# Patient Record
Sex: FEMALE | Race: WHITE | NOT HISPANIC OR LATINO | Employment: OTHER | URBAN - METROPOLITAN AREA
[De-identification: names, ages, dates, MRNs, and addresses within clinical notes are randomized per-mention and may not be internally consistent; named-entity substitution may affect disease eponyms.]

---

## 2017-01-04 ENCOUNTER — OFFICE VISIT (OUTPATIENT)
Dept: FAMILY MEDICINE CLINIC | Facility: CLINIC | Age: 57
End: 2017-01-04

## 2017-01-04 VITALS
BODY MASS INDEX: 35.7 KG/M2 | WEIGHT: 241 LBS | DIASTOLIC BLOOD PRESSURE: 80 MMHG | OXYGEN SATURATION: 99 % | RESPIRATION RATE: 16 BRPM | SYSTOLIC BLOOD PRESSURE: 140 MMHG | HEIGHT: 69 IN | HEART RATE: 80 BPM | TEMPERATURE: 98.9 F

## 2017-01-04 DIAGNOSIS — F33.1 MODERATE EPISODE OF RECURRENT MAJOR DEPRESSIVE DISORDER (HCC): Primary | ICD-10-CM

## 2017-01-04 DIAGNOSIS — J06.9 ACUTE URI: ICD-10-CM

## 2017-01-04 DIAGNOSIS — I10 ESSENTIAL HYPERTENSION, BENIGN: ICD-10-CM

## 2017-01-04 DIAGNOSIS — R41.3 MEMORY CHANGE: ICD-10-CM

## 2017-01-04 PROCEDURE — 99214 OFFICE O/P EST MOD 30 MIN: CPT | Performed by: FAMILY MEDICINE

## 2017-01-04 NOTE — MR AVS SNAPSHOT
Alice Akins   1/4/2017 10:45 AM   Office Visit    Provider:  Paul Lau MD   Department:  [facility] PRIMARY CARE   Dept Phone:  [phone]                Your Full Care Plan              Your Updated Medication List          This list is accurate as of: 1/4/17 11:26 AM.  Always use your most recent med list.                amLODIPine 5 MG tablet   Commonly known as:  NORVASC   Take 1 tablet by mouth Daily.       celecoxib 200 MG capsule   Commonly known as:  CeleBREX   Take 1 capsule by mouth daily. 1 po qd       cetirizine 10 MG tablet   Commonly known as:  zyrTEC       DULoxetine 60 MG capsule   Commonly known as:  CYMBALTA   Take 1 capsule by mouth daily. 1 po qd       fish oil 1000 MG capsule capsule       flunisolide 25 MCG/ACT (0.025%) solution nasal spray   Commonly known as:  NASALIDE   Inhale 2 sprays Every 12 (Twelve) Hours.       Ginkgo Biloba 40 MG tablet       GINSENG PO       levothyroxine 25 MCG tablet   Commonly known as:  SYNTHROID, LEVOTHROID   Take 1 tablet by mouth daily. 1 po qd       montelukast 10 MG tablet   Commonly known as:  SINGULAIR   Take 1 tablet by mouth every night. 1 po qd       pantoprazole 40 MG EC tablet   Commonly known as:  PROTONIX   Take 1 tablet by mouth daily. 1 po qd       * timolol 0.5 % ophthalmic solution   Commonly known as:  TIMOPTIC       * timolol 0.25 % ophthalmic gel-forming   Commonly known as:  TIMOPTIC-XR       valACYclovir 1000 MG tablet   Commonly known as:  VALTREX   Take 2 tabs po 12 hrs apart for 24 hrs       valsartan 320 MG tablet   Commonly known as:  DIOVAN   Take 1 tablet by mouth daily. 1 po qd       * Notice:  This list has 2 medication(s) that are the same as other medications prescribed for you. Read the directions carefully, and ask your doctor or other care provider to review them with you.            We Performed the Following     Ambulatory Referral to Psychiatry       You Were Diagnosed  "With        Codes Comments    Moderate episode of recurrent major depressive disorder    -  Primary ICD-10-CM: F33.1  ICD-9-CM: 296.32       Instructions    I have referred you to Elaina Angel for evaluation.Please follow up woth me in one month.     Patient Instructions History      Controlushart Signup     Our records indicate that you have an active [facility] [employer] account.    You can view your After Visit Summary by going to [employer] and logging in with your [employer] username and password.  If you don't have a [employer] username and password but a parent or guardian has access to your record, the parent or guardian should login with their own [employer] username and password and access your record to view the After Visit Summary.    If you have questions, you can email [email or web address] or call [phone] to talk to our [employer] staff.  Remember, [employer] is NOT to be used for urgent needs.  For medical emergencies, dial 911.               Other Info from Your Visit           Your Appointments     Feb 07, 2017  9:00 AM EST   Office Visit with Paul Lau MD   [facility] PRIMARY CARE (--)    [address] [facility] [address] [phone]           Arrive 15 minutes prior to appointment.            Feb 15, 2017 11:00 AM EST   Consult Sleep Clinic with  [facility] PROVIDER SCHEDULE   [facility] ([place])    [address] [facility] [address] [phone]              Allergies     Penicillins  Itching      Reason for Visit     Hypertension     Depression     Memory Loss           Vital Signs     Blood Pressure Pulse Temperature Respirations Height Weight    140/80 80 98.9 °F (37.2 °C) 16 68.5\" (174 cm) 241 lb (109 kg)    Oxygen Saturation Body Mass Index Smoking Status             99% 36.11 kg/m2 Former Smoker         Problems and Diagnoses Noted     Depression      "

## 2017-01-04 NOTE — PROGRESS NOTES
Subjective   Alice Akins is a 56 y.o. female.     History of Present Illness   Alice Adams is here for 3 issues  1)BP check .  She is taking her meds as Rx'd.  Denies headache, dizziness or vision changes.  2)Depression.  Pt is taking Cymbalta but feels it is not working as well for her.  She states she is isolating herself, eating more sleeping too much.  She states she just doesn't want to do anything.  Alice Adams states this began before the election but the election seemed to make it worse.  3)Memory Loss.  Alice Adams is interested in having some testing.  She states she is very forgetful and her wife is beginning to notice it.  4. She has had a few days of headache, congestion and ear fullness but is feeling better today.    The following portions of the patient's history were reviewed and updated as appropriate: allergies, current medications, past medical history, past social history and problem list.    Review of Systems   Constitutional: Positive for fatigue and fever.   HENT: Positive for sinus pressure.    Respiratory: Positive for cough.    Neurological: Positive for headaches.   All other systems reviewed and are negative.      Objective   Physical Exam   Constitutional: She is oriented to person, place, and time. She appears well-developed and well-nourished. No distress.   HENT:   Head: Normocephalic and atraumatic.   Nose: Nose normal.   Mouth/Throat: Oropharynx is clear and moist. No oropharyngeal exudate.   Eyes: Conjunctivae and EOM are normal. Pupils are equal, round, and reactive to light.   Neck: Normal range of motion.   Cardiovascular: Normal rate and regular rhythm.    Pulmonary/Chest: Effort normal and breath sounds normal. No stridor. No respiratory distress. She has no wheezes.   Lymphadenopathy:     She has no cervical adenopathy.   Neurological: She is alert and oriented to person, place, and time.   Skin: Skin is warm and dry. Rash noted.   Psychiatric: She has a normal mood and affect.  Her behavior is normal. Judgment and thought content normal.   Nursing note and vitals reviewed.      Assessment/Plan   Alice was seen today for hypertension, depression and memory loss.    Diagnoses and all orders for this visit:    Moderate episode of recurrent major depressive disorder  Memory change  -     Ambulatory Referral to Psychiatry  She is on Cymbalta and has struggled with depression most of her life. She denies suicidal thoughts or plans but feels she just has no desire to get off the couch and do anything. I am concerned about evaluation memory changes at this time because this could all be due to depressive syndrome. I have referred her to psych for help with this and she knows she can call me anytime.    Essential hypertension, benign  She is taking meds as prescribed and is stable.    Acute URI  I have advised OTC meds and she will call me is her symptoms get worse.

## 2017-02-07 ENCOUNTER — OFFICE VISIT (OUTPATIENT)
Dept: FAMILY MEDICINE CLINIC | Facility: CLINIC | Age: 57
End: 2017-02-07

## 2017-02-07 VITALS
HEART RATE: 68 BPM | RESPIRATION RATE: 16 BRPM | WEIGHT: 239 LBS | SYSTOLIC BLOOD PRESSURE: 130 MMHG | BODY MASS INDEX: 35.4 KG/M2 | HEIGHT: 69 IN | DIASTOLIC BLOOD PRESSURE: 74 MMHG | OXYGEN SATURATION: 99 %

## 2017-02-07 DIAGNOSIS — E03.9 ACQUIRED HYPOTHYROIDISM: ICD-10-CM

## 2017-02-07 DIAGNOSIS — E78.2 MIXED HYPERLIPIDEMIA: Primary | ICD-10-CM

## 2017-02-07 DIAGNOSIS — I10 ESSENTIAL HYPERTENSION, BENIGN: ICD-10-CM

## 2017-02-07 DIAGNOSIS — R53.81 MALAISE AND FATIGUE: ICD-10-CM

## 2017-02-07 DIAGNOSIS — F32.89 OTHER DEPRESSION: ICD-10-CM

## 2017-02-07 DIAGNOSIS — R53.83 MALAISE AND FATIGUE: ICD-10-CM

## 2017-02-07 PROCEDURE — 99213 OFFICE O/P EST LOW 20 MIN: CPT | Performed by: FAMILY MEDICINE

## 2017-02-07 RX ORDER — VILAZODONE HYDROCHLORIDE 10 MG/1
10 TABLET ORAL DAILY
COMMUNITY
End: 2017-05-03

## 2017-02-07 RX ORDER — BUPROPION HYDROCHLORIDE 150 MG/1
150 TABLET ORAL DAILY
COMMUNITY
End: 2017-05-03

## 2017-02-07 NOTE — PROGRESS NOTES
Subjective   Alice Akins is a 56 y.o. female.     History of Present Illness   Alice Adams is here for depression and bp check.  She has seen Elaina Angel twice.  She is trying Wellbutrin and starting a new medication tomorrow, Vibryd.She is feeling a little better because of an upcoming move and [place] out [place].     She has not been checking her bp at home.  Denies dizziness, blurred vision and vision changes.She is tolerating new medication well.  She and her partner are planning to move to [place] to live part time.She will be moving in May.    The following portions of the patient's history were reviewed and updated as appropriate: allergies, current medications, past medical history, past social history and problem list.    Review of Systems   All other systems reviewed and are negative.      Objective   Physical Exam   Constitutional: She is oriented to person, place, and time. She appears well-developed. No distress.   Cardiovascular: Normal rate and regular rhythm.    No murmur heard.  Pulmonary/Chest: Effort normal and breath sounds normal.   Neurological: She is alert and oriented to person, place, and time.   Psychiatric: She has a normal mood and affect. Her behavior is normal. Judgment and thought content normal.   Nursing note and vitals reviewed.      Assessment/Plan   Alice was seen today for depression and hypertension.    Diagnoses and all orders for this visit:    Other depression  She is doing better and changing medications to improve mood. Followed by psychiatry.    Essential hypertension, benign  Tolerating meds well and B/P is well controlled.    She will RTO for fasting labs in 3 months:   Mixed hyperlipidemia  -     Comprehensive metabolic panel; Future  -     Lipid Panel With LDL/HDL Ratio; Future    Malaise and fatigue  -     CBC and Differential; Future    Acquired hypothyroidism  -     TSH; Future

## 2017-05-01 ENCOUNTER — LAB (OUTPATIENT)
Dept: FAMILY MEDICINE CLINIC | Facility: CLINIC | Age: 57
End: 2017-05-01

## 2017-05-01 ENCOUNTER — RESULTS ENCOUNTER (OUTPATIENT)
Dept: FAMILY MEDICINE CLINIC | Facility: CLINIC | Age: 57
End: 2017-05-01

## 2017-05-01 DIAGNOSIS — R53.83 MALAISE AND FATIGUE: ICD-10-CM

## 2017-05-01 DIAGNOSIS — E03.9 ACQUIRED HYPOTHYROIDISM: ICD-10-CM

## 2017-05-01 DIAGNOSIS — R53.81 MALAISE AND FATIGUE: ICD-10-CM

## 2017-05-01 DIAGNOSIS — E78.2 MIXED HYPERLIPIDEMIA: ICD-10-CM

## 2017-05-01 LAB
ALBUMIN SERPL-MCNC: 4.3 G/DL (ref 3.5–5.2)
ALBUMIN/GLOB SERPL: 2.2 G/DL
ALP SERPL-CCNC: 73 U/L (ref 39–117)
ALT SERPL-CCNC: 15 U/L (ref 1–33)
AST SERPL-CCNC: 18 U/L (ref 1–32)
BASOPHILS # BLD AUTO: 0.05 10*3/MM3 (ref 0–0.2)
BASOPHILS NFR BLD AUTO: 0.6 % (ref 0–1.5)
BILIRUB SERPL-MCNC: 0.3 MG/DL (ref 0.1–1.2)
BUN SERPL-MCNC: 12 MG/DL (ref 6–20)
BUN/CREAT SERPL: 18.8 (ref 7–25)
CALCIUM SERPL-MCNC: 9.3 MG/DL (ref 8.6–10.5)
CHLORIDE SERPL-SCNC: 102 MMOL/L (ref 98–107)
CHOLEST SERPL-MCNC: 187 MG/DL (ref 0–200)
CO2 SERPL-SCNC: 25.4 MMOL/L (ref 22–29)
CREAT SERPL-MCNC: 0.64 MG/DL (ref 0.57–1)
EOSINOPHIL # BLD AUTO: 0.08 10*3/MM3 (ref 0–0.7)
EOSINOPHIL NFR BLD AUTO: 1 % (ref 0.3–6.2)
ERYTHROCYTE [DISTWIDTH] IN BLOOD BY AUTOMATED COUNT: 13.9 % (ref 11.7–13)
GLOBULIN SER CALC-MCNC: 2 GM/DL
GLUCOSE SERPL-MCNC: 110 MG/DL (ref 65–99)
HCT VFR BLD AUTO: 39.9 % (ref 35.6–45.5)
HDLC SERPL-MCNC: 53 MG/DL (ref 40–60)
HGB BLD-MCNC: 12.7 G/DL (ref 11.9–15.5)
IMM GRANULOCYTES # BLD: 0 10*3/MM3 (ref 0–0.03)
IMM GRANULOCYTES NFR BLD: 0 % (ref 0–0.5)
LDLC SERPL CALC-MCNC: 117 MG/DL (ref 0–100)
LDLC/HDLC SERPL: 2.2 {RATIO}
LYMPHOCYTES # BLD AUTO: 1.37 10*3/MM3 (ref 0.9–4.8)
LYMPHOCYTES NFR BLD AUTO: 16.4 % (ref 19.6–45.3)
MCH RBC QN AUTO: 28.3 PG (ref 26.9–32)
MCHC RBC AUTO-ENTMCNC: 31.8 G/DL (ref 32.4–36.3)
MCV RBC AUTO: 88.9 FL (ref 80.5–98.2)
MONOCYTES # BLD AUTO: 0.47 10*3/MM3 (ref 0.2–1.2)
MONOCYTES NFR BLD AUTO: 5.6 % (ref 5–12)
NEUTROPHILS # BLD AUTO: 6.37 10*3/MM3 (ref 1.9–8.1)
NEUTROPHILS NFR BLD AUTO: 76.4 % (ref 42.7–76)
PLATELET # BLD AUTO: 338 10*3/MM3 (ref 140–500)
POTASSIUM SERPL-SCNC: 4.1 MMOL/L (ref 3.5–5.2)
PROT SERPL-MCNC: 6.3 G/DL (ref 6–8.5)
RBC # BLD AUTO: 4.49 10*6/MM3 (ref 3.9–5.2)
SODIUM SERPL-SCNC: 140 MMOL/L (ref 136–145)
TRIGL SERPL-MCNC: 86 MG/DL (ref 0–150)
TSH SERPL DL<=0.005 MIU/L-ACNC: 0.6 MIU/ML (ref 0.27–4.2)
VLDLC SERPL CALC-MCNC: 17.2 MG/DL (ref 5–40)
WBC # BLD AUTO: 8.34 10*3/MM3 (ref 4.5–10.7)

## 2017-05-03 ENCOUNTER — OFFICE VISIT (OUTPATIENT)
Dept: FAMILY MEDICINE CLINIC | Facility: CLINIC | Age: 57
End: 2017-05-03

## 2017-05-03 VITALS
DIASTOLIC BLOOD PRESSURE: 70 MMHG | BODY MASS INDEX: 33.18 KG/M2 | OXYGEN SATURATION: 100 % | HEIGHT: 69 IN | HEART RATE: 82 BPM | SYSTOLIC BLOOD PRESSURE: 120 MMHG | WEIGHT: 224 LBS

## 2017-05-03 DIAGNOSIS — E78.2 MIXED HYPERLIPIDEMIA: ICD-10-CM

## 2017-05-03 DIAGNOSIS — B00.2 ORAL HERPES SIMPLEX INFECTION: ICD-10-CM

## 2017-05-03 DIAGNOSIS — R73.01 IMPAIRED FASTING GLUCOSE: ICD-10-CM

## 2017-05-03 DIAGNOSIS — I10 ESSENTIAL HYPERTENSION, BENIGN: ICD-10-CM

## 2017-05-03 DIAGNOSIS — J30.1 ALLERGIC RHINITIS DUE TO POLLEN, UNSPECIFIED RHINITIS SEASONALITY: Primary | ICD-10-CM

## 2017-05-03 DIAGNOSIS — E03.9 ACQUIRED HYPOTHYROIDISM: ICD-10-CM

## 2017-05-03 PROCEDURE — 99214 OFFICE O/P EST MOD 30 MIN: CPT | Performed by: FAMILY MEDICINE

## 2017-05-03 RX ORDER — VALACYCLOVIR HYDROCHLORIDE 1 G/1
TABLET, FILM COATED ORAL
Qty: 30 TABLET | Refills: 6 | Status: SHIPPED | OUTPATIENT
Start: 2017-05-03 | End: 2019-12-01 | Stop reason: SDUPTHER

## 2017-05-03 RX ORDER — LORAZEPAM 0.5 MG/1
1 TABLET ORAL DAILY PRN
COMMUNITY
Start: 2017-05-02 | End: 2019-04-04

## 2017-05-03 RX ORDER — FLUOXETINE HYDROCHLORIDE 40 MG/1
1 CAPSULE ORAL DAILY
COMMUNITY
Start: 2017-03-15 | End: 2017-12-07 | Stop reason: DRUGHIGH

## 2017-05-03 RX ORDER — AZELASTINE 1 MG/ML
2 SPRAY, METERED NASAL 2 TIMES DAILY
Qty: 30 ML | Refills: 6 | Status: SHIPPED | OUTPATIENT
Start: 2017-05-03 | End: 2017-06-05 | Stop reason: SDUPTHER

## 2017-05-03 RX ORDER — AZELASTINE 1 MG/ML
2 SPRAY, METERED NASAL 2 TIMES DAILY
COMMUNITY
End: 2017-05-03 | Stop reason: SDUPTHER

## 2017-05-03 RX ORDER — FLUOXETINE HYDROCHLORIDE 20 MG/1
60 CAPSULE ORAL DAILY
COMMUNITY
Start: 2017-04-28 | End: 2022-01-20 | Stop reason: ALTCHOICE

## 2017-06-05 DIAGNOSIS — J30.1 ALLERGIC RHINITIS DUE TO POLLEN, UNSPECIFIED RHINITIS SEASONALITY: ICD-10-CM

## 2017-06-05 RX ORDER — CELECOXIB 200 MG/1
200 CAPSULE ORAL DAILY
Qty: 90 CAPSULE | Refills: 0 | Status: SHIPPED | OUTPATIENT
Start: 2017-06-05 | End: 2017-08-10 | Stop reason: SDUPTHER

## 2017-06-05 RX ORDER — AMLODIPINE BESYLATE 5 MG/1
5 TABLET ORAL DAILY
Qty: 90 TABLET | Refills: 0 | Status: SHIPPED | OUTPATIENT
Start: 2017-06-05 | End: 2017-08-10 | Stop reason: SDUPTHER

## 2017-06-05 RX ORDER — AZELASTINE 1 MG/ML
2 SPRAY, METERED NASAL 2 TIMES DAILY
Qty: 30 ML | Refills: 6 | Status: SHIPPED | OUTPATIENT
Start: 2017-06-05 | End: 2017-12-07 | Stop reason: SDUPTHER

## 2017-06-05 RX ORDER — LEVOTHYROXINE SODIUM 0.03 MG/1
25 TABLET ORAL DAILY
Qty: 90 TABLET | Refills: 0 | Status: SHIPPED | OUTPATIENT
Start: 2017-06-05 | End: 2017-08-10 | Stop reason: SDUPTHER

## 2017-06-05 RX ORDER — MONTELUKAST SODIUM 10 MG/1
10 TABLET ORAL NIGHTLY
Qty: 90 TABLET | Refills: 0 | Status: SHIPPED | OUTPATIENT
Start: 2017-06-05 | End: 2017-08-10 | Stop reason: SDUPTHER

## 2017-06-05 RX ORDER — PANTOPRAZOLE SODIUM 40 MG/1
40 TABLET, DELAYED RELEASE ORAL DAILY
Qty: 90 TABLET | Refills: 0 | Status: SHIPPED | OUTPATIENT
Start: 2017-06-05 | End: 2017-08-10 | Stop reason: SDUPTHER

## 2017-06-05 RX ORDER — VALSARTAN 320 MG/1
320 TABLET ORAL DAILY
Qty: 90 TABLET | Refills: 0 | Status: SHIPPED | OUTPATIENT
Start: 2017-06-05 | End: 2017-08-10 | Stop reason: SDUPTHER

## 2017-06-05 RX ORDER — FLUNISOLIDE 0.25 MG/ML
2 SOLUTION NASAL EVERY 12 HOURS
Qty: 1 BOTTLE | Refills: 6 | Status: SHIPPED | OUTPATIENT
Start: 2017-06-05 | End: 2017-08-10 | Stop reason: SDUPTHER

## 2017-08-10 RX ORDER — CELECOXIB 200 MG/1
200 CAPSULE ORAL DAILY
Qty: 90 CAPSULE | Refills: 0 | Status: SHIPPED | OUTPATIENT
Start: 2017-08-10 | End: 2017-12-07 | Stop reason: SDUPTHER

## 2017-08-10 RX ORDER — PANTOPRAZOLE SODIUM 40 MG/1
40 TABLET, DELAYED RELEASE ORAL DAILY
Qty: 90 TABLET | Refills: 0 | Status: SHIPPED | OUTPATIENT
Start: 2017-08-10 | End: 2017-12-07 | Stop reason: SDUPTHER

## 2017-08-10 RX ORDER — AMLODIPINE BESYLATE 5 MG/1
5 TABLET ORAL DAILY
Qty: 90 TABLET | Refills: 0 | Status: SHIPPED | OUTPATIENT
Start: 2017-08-10 | End: 2017-12-07 | Stop reason: SDUPTHER

## 2017-08-10 RX ORDER — MONTELUKAST SODIUM 10 MG/1
10 TABLET ORAL NIGHTLY
Qty: 90 TABLET | Refills: 0 | Status: SHIPPED | OUTPATIENT
Start: 2017-08-10 | End: 2017-12-07 | Stop reason: SDUPTHER

## 2017-08-10 RX ORDER — LEVOTHYROXINE SODIUM 0.03 MG/1
25 TABLET ORAL DAILY
Qty: 90 TABLET | Refills: 0 | Status: SHIPPED | OUTPATIENT
Start: 2017-08-10 | End: 2017-12-07 | Stop reason: SDUPTHER

## 2017-08-10 RX ORDER — VALSARTAN 320 MG/1
320 TABLET ORAL DAILY
Qty: 90 TABLET | Refills: 0 | Status: SHIPPED | OUTPATIENT
Start: 2017-08-10 | End: 2017-12-07 | Stop reason: SDUPTHER

## 2017-08-10 RX ORDER — FLUNISOLIDE 0.25 MG/ML
2 SOLUTION NASAL EVERY 12 HOURS
Qty: 1 BOTTLE | Refills: 2 | Status: SHIPPED | OUTPATIENT
Start: 2017-08-10 | End: 2017-12-07

## 2017-12-05 DIAGNOSIS — I10 ESSENTIAL HYPERTENSION, MALIGNANT: Primary | ICD-10-CM

## 2017-12-05 DIAGNOSIS — Z13.220 SCREENING FOR LIPOID DISORDERS: ICD-10-CM

## 2017-12-05 DIAGNOSIS — E03.8 TSH DEFICIENCY: ICD-10-CM

## 2017-12-05 DIAGNOSIS — Z00.00 ROUTINE GENERAL MEDICAL EXAMINATION AT A HEALTH CARE FACILITY: ICD-10-CM

## 2017-12-05 DIAGNOSIS — Z11.59 NEED FOR HEPATITIS C SCREENING TEST: ICD-10-CM

## 2017-12-06 LAB
ALBUMIN SERPL-MCNC: 4.6 G/DL (ref 3.5–5.2)
ALBUMIN/GLOB SERPL: 2 G/DL
ALP SERPL-CCNC: 73 U/L (ref 39–117)
ALT SERPL-CCNC: 17 U/L (ref 1–33)
AST SERPL-CCNC: 17 U/L (ref 1–32)
BILIRUB SERPL-MCNC: 0.4 MG/DL (ref 0.1–1.2)
BUN SERPL-MCNC: 13 MG/DL (ref 6–20)
BUN/CREAT SERPL: 20.3 (ref 7–25)
CALCIUM SERPL-MCNC: 9.6 MG/DL (ref 8.6–10.5)
CHLORIDE SERPL-SCNC: 102 MMOL/L (ref 98–107)
CHOLEST SERPL-MCNC: 230 MG/DL (ref 0–200)
CO2 SERPL-SCNC: 28.5 MMOL/L (ref 22–29)
CREAT SERPL-MCNC: 0.64 MG/DL (ref 0.57–1)
ERYTHROCYTE [DISTWIDTH] IN BLOOD BY AUTOMATED COUNT: 13.8 % (ref 11.7–13)
GFR SERPLBLD CREATININE-BSD FMLA CKD-EPI: 116 ML/MIN/1.73
GFR SERPLBLD CREATININE-BSD FMLA CKD-EPI: 96 ML/MIN/1.73
GLOBULIN SER CALC-MCNC: 2.3 GM/DL
GLUCOSE SERPL-MCNC: 88 MG/DL (ref 65–99)
HCT VFR BLD AUTO: 41.7 % (ref 35.6–45.5)
HCV AB S/CO SERPL IA: <0.1 S/CO RATIO (ref 0–0.9)
HCV AB SERPL QL IA: NORMAL
HDLC SERPL-MCNC: 65 MG/DL (ref 40–60)
HGB BLD-MCNC: 13.2 G/DL (ref 11.9–15.5)
LDLC SERPL CALC-MCNC: 143 MG/DL (ref 0–100)
LDLC/HDLC SERPL: 2.19 {RATIO}
MCH RBC QN AUTO: 28.5 PG (ref 26.9–32)
MCHC RBC AUTO-ENTMCNC: 31.7 G/DL (ref 32.4–36.3)
MCV RBC AUTO: 90.1 FL (ref 80.5–98.2)
PLATELET # BLD AUTO: 332 10*3/MM3 (ref 140–500)
POTASSIUM SERPL-SCNC: 4.3 MMOL/L (ref 3.5–5.2)
PROT SERPL-MCNC: 6.9 G/DL (ref 6–8.5)
RBC # BLD AUTO: 4.63 10*6/MM3 (ref 3.9–5.2)
SODIUM SERPL-SCNC: 142 MMOL/L (ref 136–145)
TRIGL SERPL-MCNC: 112 MG/DL (ref 0–150)
TSH SERPL DL<=0.005 MIU/L-ACNC: 1.35 MIU/ML (ref 0.27–4.2)
VLDLC SERPL CALC-MCNC: 22.4 MG/DL (ref 5–40)
WBC # BLD AUTO: 6.41 10*3/MM3 (ref 4.5–10.7)

## 2017-12-07 ENCOUNTER — OFFICE VISIT (OUTPATIENT)
Dept: FAMILY MEDICINE CLINIC | Facility: CLINIC | Age: 57
End: 2017-12-07

## 2017-12-07 VITALS
WEIGHT: 219 LBS | BODY MASS INDEX: 32.44 KG/M2 | HEIGHT: 69 IN | RESPIRATION RATE: 16 BRPM | OXYGEN SATURATION: 99 % | SYSTOLIC BLOOD PRESSURE: 144 MMHG | DIASTOLIC BLOOD PRESSURE: 64 MMHG | HEART RATE: 68 BPM

## 2017-12-07 DIAGNOSIS — Z23 NEED FOR IMMUNIZATION AGAINST INFLUENZA: ICD-10-CM

## 2017-12-07 DIAGNOSIS — I10 ESSENTIAL HYPERTENSION: ICD-10-CM

## 2017-12-07 DIAGNOSIS — E78.49 OTHER HYPERLIPIDEMIA: ICD-10-CM

## 2017-12-07 DIAGNOSIS — F32.89 OTHER DEPRESSION: ICD-10-CM

## 2017-12-07 DIAGNOSIS — Z00.00 ROUTINE GENERAL MEDICAL EXAMINATION AT A HEALTH CARE FACILITY: Primary | ICD-10-CM

## 2017-12-07 DIAGNOSIS — E03.9 ACQUIRED HYPOTHYROIDISM: ICD-10-CM

## 2017-12-07 DIAGNOSIS — Z12.39 SCREENING FOR MALIGNANT NEOPLASM OF BREAST: ICD-10-CM

## 2017-12-07 PROCEDURE — 99396 PREV VISIT EST AGE 40-64: CPT | Performed by: FAMILY MEDICINE

## 2017-12-07 PROCEDURE — 90686 IIV4 VACC NO PRSV 0.5 ML IM: CPT | Performed by: FAMILY MEDICINE

## 2017-12-07 PROCEDURE — 90471 IMMUNIZATION ADMIN: CPT | Performed by: FAMILY MEDICINE

## 2017-12-07 RX ORDER — FLUNISOLIDE 0.25 MG/ML
2 SOLUTION NASAL EVERY 12 HOURS
Qty: 1 BOTTLE | Refills: 11 | Status: SHIPPED | OUTPATIENT
Start: 2017-12-07

## 2017-12-07 RX ORDER — AZELASTINE 1 MG/ML
2 SPRAY, METERED NASAL 2 TIMES DAILY
Qty: 30 ML | Refills: 11 | Status: SHIPPED | OUTPATIENT
Start: 2017-12-07 | End: 2019-12-01 | Stop reason: SDUPTHER

## 2017-12-07 RX ORDER — AMLODIPINE BESYLATE 5 MG/1
5 TABLET ORAL DAILY
Qty: 90 TABLET | Refills: 3 | Status: SHIPPED | OUTPATIENT
Start: 2017-12-07 | End: 2020-01-20 | Stop reason: SDUPTHER

## 2017-12-07 RX ORDER — VALSARTAN 320 MG/1
320 TABLET ORAL DAILY
Qty: 90 TABLET | Refills: 3 | Status: SHIPPED | OUTPATIENT
Start: 2017-12-07 | End: 2020-01-20 | Stop reason: SDUPTHER

## 2017-12-07 RX ORDER — LEVOTHYROXINE SODIUM 0.03 MG/1
25 TABLET ORAL DAILY
Qty: 90 TABLET | Refills: 3 | Status: SHIPPED | OUTPATIENT
Start: 2017-12-07 | End: 2020-01-20 | Stop reason: SDUPTHER

## 2017-12-07 RX ORDER — CELECOXIB 200 MG/1
200 CAPSULE ORAL DAILY
Qty: 90 CAPSULE | Refills: 3 | Status: SHIPPED | OUTPATIENT
Start: 2017-12-07 | End: 2019-03-15 | Stop reason: SDUPTHER

## 2017-12-07 RX ORDER — MONTELUKAST SODIUM 10 MG/1
10 TABLET ORAL NIGHTLY
Qty: 90 TABLET | Refills: 3 | Status: SHIPPED | OUTPATIENT
Start: 2017-12-07 | End: 2019-04-04 | Stop reason: SDUPTHER

## 2017-12-07 RX ORDER — PANTOPRAZOLE SODIUM 40 MG/1
40 TABLET, DELAYED RELEASE ORAL DAILY
Qty: 90 TABLET | Refills: 3 | Status: SHIPPED | OUTPATIENT
Start: 2017-12-07 | End: 2019-04-04 | Stop reason: SDUPTHER

## 2017-12-07 NOTE — PROGRESS NOTES
"Preventive Exam    History of Present Illness: Alice Adams is here for check up and review of routine health maintenance. She states she is doing well and has no concerns  Alice Adams has a history of chronic hypertension and has been well controlled on current medications.She is tolerating medications without side effect. She reports no vision changes, headaches or lightheadedness. She is requesting refills of medications.  She has a history of chronic hyperlipidemia and needs lab work today to evaluate. She is not on medications.  Alice Adams has a history of chronic hypothyroidism and has been well controlled on current dose of replacement.She report no concerning symptoms: no cold intolerance, fatigue or hair loss.   Alice Adams states that she is getting good relief from symptoms on current medication. This is a chronic problem that is responding well to treatment. She has no intolerable side effects to medication and reports that his helps lift mood and makes daily life, relationships better. No thoughts of self harm expressed.  She is also can struggling with back pain. She is on Celebrex and sees a chiropractor and she is still struggling. She would like to see PT for this when she gets back to [place] and needs a written referral.    REVIEW OF SYSTEMS  Constitutional: Negative.    HENT: Negative.    Eyes: Negative.    Respiratory: Negative.    Cardiovascular: Negative.    Gastrointestinal: Negative.    Endocrine: Negative.    Genitourinary: Negative.    Musculoskeletal: Negative.  Skin: Negative.    Allergic/Immunologic: Negative.    Neurological: Negative.    Hematological: Negative.    Psychiatric/Behavioral: Negative.    All other systems reviewed and are negative.          PHYSICAL EXAM    Vitals:    12/07/17 0859   BP: 144/64   Pulse: 68   Resp: 16   SpO2: 99%   Weight: 99.3 kg (219 lb)   Height: 174 cm (68.5\")     GENERAL: alert and oriented, afebrile and vital signs stable  HEENT: oral mucosa moist, PEERLA, EOM, " conjunctiva normal  No cervical adenopathy  LUNGS: clear to ascultation bilaterally, no rales, ronchi or wheezing  HEART: RRR S1 S2 without murmers, thrills, rubs or gallops  CHEST WALL: within normal limits, no tenderness  ABDOMEN: WNL. Normal BS.  EXTREMITIES: No clubbing, cyanosis or edema noted. Normal Pulses.  SKIN: warm, dry, no rashes noted  NEURO: CN II- XII grossly intact    ASSESSMENT AND PLAN  Problem List Items Addressed This Visit        Cardiovascular and Mediastinum    Hypertension  Well controlled on current medication, will refill medication today and as needed. She will RTO for repeat B/P check in 6 months.       Endocrine    Hypothyroidism  Well managed on current dose of thyroid replacement. TSH reviewed and on chart.  Well refill medications as needed.       Other    Depression  She is well managed on current meds and reports no signs or symptoms of self harm, suicidal ideation. This medication helps with daily life and relationships. Will refill as needed and advised 6 month follow up.      Other Visit Diagnoses     Routine general medical examination at a health care facility    -  Primary    Screening for malignant neoplasm of breast        Relevant Orders    Mammo Screening Bilateral With CAD    Need for immunization against influenza        Relevant Orders    Flu Vaccine Quad PF 3YR+ (FLUARIX/FLUZONE 8790-0811) (Completed)    Other hyperlipidemia  Will check lipid panel today and She was advised to continue a healthy low fat diet, include regular exercise/              Routine health maintenance reviewed and discussed with Alice.    .  Orders Placed This Encounter   Procedures   • Mammo Screening Bilateral With CAD     Standing Status:   Future     Standing Expiration Date:   12/8/2018     Order Specific Question:   Reason for Exam:     Answer:   screening mammogram   • Flu Vaccine Quad PF 3YR+ (FLUARIX/FLUZONE [id number])     Return in about 6 months (around 6/7/2018) for Recheck.

## 2017-12-07 NOTE — PATIENT INSTRUCTIONS
Annual Wellness  Personal Prevention Plan of Service     Date of Office Visit:  2017  Encounter Provider:  Paul Lau MD  Place of Service:  [facility] PRIMARY CARE  Patient Name: Alice Akins  :  1960    As part of the Annual Wellness portion of your visit today, we are providing you with this personalized preventive plan of services (PPPS). This plan is based upon recommendations of the [place] Preventive Services Task Force (USPSTF) and the Advisory Committee on Immunization Practices (ACIP).    This lists the preventive care services that should be considered, and provides dates of when you are due. Items listed as completed are up-to-date and do not require any further intervention.    Health Maintenance   Topic Date Due   • MAMMOGRAM  2018   • LIPID PANEL  2018   • TDAP/TD VACCINES (2 - Td) 2022   • COLONOSCOPY  2026   • HEPATITIS C SCREENING  Completed   • INFLUENZA VACCINE  Completed   • PAP SMEAR  Excluded       Orders Placed This Encounter   Procedures   • Mammo Screening Bilateral With CAD     Standing Status:   Future     Standing Expiration Date:   2018     Order Specific Question:   Reason for Exam:     Answer:   screening mammogram   • Flu Vaccine Quad PF 3YR+ (FLUARIX/FLUZONE [id number])       Return in about 6 months (around 2018) for Recheck.

## 2017-12-27 ENCOUNTER — TELEPHONE (OUTPATIENT)
Dept: FAMILY MEDICINE CLINIC | Facility: CLINIC | Age: 57
End: 2017-12-27

## 2017-12-27 RX ORDER — OSELTAMIVIR PHOSPHATE 75 MG/1
75 CAPSULE ORAL DAILY
Qty: 10 CAPSULE | Refills: 0 | Status: SHIPPED | OUTPATIENT
Start: 2017-12-27 | End: 2019-04-04

## 2017-12-27 NOTE — TELEPHONE ENCOUNTER
----- Message from Maria Alejandra Pineda sent at 12/27/2017 12:15 PM EST -----  Patient partner has been dx with the flu, and is requesting Tamiflu be called to [facility].  Call back number [phone]

## 2018-12-04 ENCOUNTER — TRANSCRIBE ORDERS (OUTPATIENT)
Dept: ADMINISTRATIVE | Facility: HOSPITAL | Age: 58
End: 2018-12-04

## 2018-12-04 ENCOUNTER — HOSPITAL ENCOUNTER (OUTPATIENT)
Dept: GENERAL RADIOLOGY | Facility: HOSPITAL | Age: 58
Discharge: HOME | End: 2018-12-04
Attending: ORTHOPAEDIC SURGERY

## 2018-12-04 ENCOUNTER — LAB (OUTPATIENT)
Dept: LAB | Facility: HOSPITAL | Age: 58
End: 2018-12-04
Attending: ORTHOPAEDIC SURGERY

## 2018-12-04 ENCOUNTER — HOSPITAL ENCOUNTER (OUTPATIENT)
Dept: CARDIOLOGY | Facility: HOSPITAL | Age: 58
Discharge: HOME | End: 2018-12-04
Attending: ORTHOPAEDIC SURGERY | Admitting: ORTHOPAEDIC SURGERY

## 2018-12-04 DIAGNOSIS — Z01.818 PRE-OP EXAM: ICD-10-CM

## 2018-12-04 DIAGNOSIS — M17.12 OSTEOARTHRITIS OF LEFT KNEE, UNSPECIFIED OSTEOARTHRITIS TYPE: ICD-10-CM

## 2018-12-04 DIAGNOSIS — M17.12 OSTEOARTHRITIS OF LEFT KNEE, UNSPECIFIED OSTEOARTHRITIS TYPE: Primary | ICD-10-CM

## 2018-12-04 LAB
ALBUMIN SERPL-MCNC: 4.2 G/DL (ref 3.5–5.2)
ALBUMIN/GLOB SERPL: 1.7 G/DL
ALP SERPL-CCNC: 80 U/L (ref 39–117)
ALT SERPL W P-5'-P-CCNC: 16 U/L (ref 1–33)
ANION GAP SERPL CALCULATED.3IONS-SCNC: 9.7 MMOL/L
APTT PPP: 27.7 SECONDS (ref 22.7–35.4)
AST SERPL-CCNC: 20 U/L (ref 1–32)
BASOPHILS # BLD AUTO: 0.04 10*3/MM3 (ref 0–0.2)
BASOPHILS NFR BLD AUTO: 0.7 % (ref 0–1.5)
BILIRUB SERPL-MCNC: 0.4 MG/DL (ref 0.1–1.2)
BILIRUB UR QL STRIP: NEGATIVE
BUN BLD-MCNC: 10 MG/DL (ref 6–20)
BUN/CREAT SERPL: 16.9 (ref 7–25)
CALCIUM SPEC-SCNC: 9.4 MG/DL (ref 8.6–10.5)
CHLORIDE SERPL-SCNC: 103 MMOL/L (ref 98–107)
CLARITY UR: CLEAR
CO2 SERPL-SCNC: 27.3 MMOL/L (ref 22–29)
COLOR UR: YELLOW
CREAT BLD-MCNC: 0.59 MG/DL (ref 0.57–1)
DEPRECATED RDW RBC AUTO: 43.9 FL (ref 37–54)
EOSINOPHIL # BLD AUTO: 0.07 10*3/MM3 (ref 0–0.7)
EOSINOPHIL NFR BLD AUTO: 1.2 % (ref 0.3–6.2)
ERYTHROCYTE [DISTWIDTH] IN BLOOD BY AUTOMATED COUNT: 14.1 % (ref 11.7–13)
ERYTHROCYTE [SEDIMENTATION RATE] IN BLOOD: 9 MM/HR (ref 0–30)
GFR SERPL CREATININE-BSD FRML MDRD: 105 ML/MIN/1.73
GLOBULIN UR ELPH-MCNC: 2.5 GM/DL
GLUCOSE BLD-MCNC: 100 MG/DL (ref 65–99)
GLUCOSE UR STRIP-MCNC: NEGATIVE MG/DL
HCT VFR BLD AUTO: 38.7 % (ref 35.6–45.5)
HGB BLD-MCNC: 12.5 G/DL (ref 11.9–15.5)
HGB UR QL STRIP.AUTO: NEGATIVE
IMM GRANULOCYTES # BLD: 0.01 10*3/MM3 (ref 0–0.03)
IMM GRANULOCYTES NFR BLD: 0.2 % (ref 0–0.5)
INR PPP: 0.96 (ref 0.9–1.1)
KETONES UR QL STRIP: NEGATIVE
LEUKOCYTE ESTERASE UR QL STRIP.AUTO: NEGATIVE
LYMPHOCYTES # BLD AUTO: 1.75 10*3/MM3 (ref 0.9–4.8)
LYMPHOCYTES NFR BLD AUTO: 30.7 % (ref 19.6–45.3)
MCH RBC QN AUTO: 27.5 PG (ref 26.9–32)
MCHC RBC AUTO-ENTMCNC: 32.3 G/DL (ref 32.4–36.3)
MCV RBC AUTO: 85.1 FL (ref 80.5–98.2)
MONOCYTES # BLD AUTO: 0.44 10*3/MM3 (ref 0.2–1.2)
MONOCYTES NFR BLD AUTO: 7.7 % (ref 5–12)
NEUTROPHILS # BLD AUTO: 3.4 10*3/MM3 (ref 1.9–8.1)
NEUTROPHILS NFR BLD AUTO: 59.7 % (ref 42.7–76)
NITRITE UR QL STRIP: NEGATIVE
PH UR STRIP.AUTO: >=9 [PH] (ref 5–8)
PLATELET # BLD AUTO: 342 10*3/MM3 (ref 140–500)
PMV BLD AUTO: 10.2 FL (ref 6–12)
POTASSIUM BLD-SCNC: 4.5 MMOL/L (ref 3.5–5.2)
PROT SERPL-MCNC: 6.7 G/DL (ref 6–8.5)
PROT UR QL STRIP: NEGATIVE
PROTHROMBIN TIME: 12.6 SECONDS (ref 11.7–14.2)
RBC # BLD AUTO: 4.55 10*6/MM3 (ref 3.9–5.2)
SODIUM BLD-SCNC: 140 MMOL/L (ref 136–145)
SP GR UR STRIP: 1.01 (ref 1–1.03)
UROBILINOGEN UR QL STRIP: ABNORMAL
WBC NRBC COR # BLD: 5.7 10*3/MM3 (ref 4.5–10.7)

## 2018-12-04 PROCEDURE — 93010 ELECTROCARDIOGRAM REPORT: CPT | Performed by: INTERNAL MEDICINE

## 2018-12-04 PROCEDURE — 85025 COMPLETE CBC W/AUTO DIFF WBC: CPT

## 2018-12-04 PROCEDURE — 80053 COMPREHEN METABOLIC PANEL: CPT

## 2018-12-04 PROCEDURE — 71046 X-RAY EXAM CHEST 2 VIEWS: CPT

## 2018-12-04 PROCEDURE — 81003 URINALYSIS AUTO W/O SCOPE: CPT

## 2018-12-04 PROCEDURE — 85730 THROMBOPLASTIN TIME PARTIAL: CPT

## 2018-12-04 PROCEDURE — 85652 RBC SED RATE AUTOMATED: CPT

## 2018-12-04 PROCEDURE — 93005 ELECTROCARDIOGRAM TRACING: CPT | Performed by: ORTHOPAEDIC SURGERY

## 2018-12-04 PROCEDURE — 85610 PROTHROMBIN TIME: CPT

## 2018-12-04 PROCEDURE — 36415 COLL VENOUS BLD VENIPUNCTURE: CPT

## 2019-03-07 RX ORDER — CELECOXIB 200 MG/1
CAPSULE ORAL
Qty: 90 CAPSULE | Refills: 2 | OUTPATIENT
Start: 2019-03-07

## 2019-03-15 RX ORDER — CELECOXIB 200 MG/1
200 CAPSULE ORAL DAILY
Qty: 90 CAPSULE | Refills: 0 | Status: SHIPPED | OUTPATIENT
Start: 2019-03-15 | End: 2019-04-04 | Stop reason: SDUPTHER

## 2019-03-19 RX ORDER — CELECOXIB 200 MG/1
CAPSULE ORAL
Qty: 90 CAPSULE | Refills: 2 | OUTPATIENT
Start: 2019-03-19

## 2019-04-04 ENCOUNTER — OFFICE VISIT (OUTPATIENT)
Dept: FAMILY MEDICINE CLINIC | Facility: CLINIC | Age: 59
End: 2019-04-04

## 2019-04-04 VITALS
HEIGHT: 68 IN | OXYGEN SATURATION: 99 % | BODY MASS INDEX: 34.25 KG/M2 | RESPIRATION RATE: 16 BRPM | WEIGHT: 226 LBS | HEART RATE: 69 BPM | DIASTOLIC BLOOD PRESSURE: 84 MMHG | SYSTOLIC BLOOD PRESSURE: 124 MMHG

## 2019-04-04 DIAGNOSIS — K21.9 GASTROESOPHAGEAL REFLUX DISEASE WITHOUT ESOPHAGITIS: ICD-10-CM

## 2019-04-04 DIAGNOSIS — E78.49 OTHER HYPERLIPIDEMIA: ICD-10-CM

## 2019-04-04 DIAGNOSIS — Z00.00 ROUTINE GENERAL MEDICAL EXAMINATION AT A HEALTH CARE FACILITY: Primary | ICD-10-CM

## 2019-04-04 DIAGNOSIS — E03.9 ACQUIRED HYPOTHYROIDISM: ICD-10-CM

## 2019-04-04 DIAGNOSIS — I10 ESSENTIAL HYPERTENSION: ICD-10-CM

## 2019-04-04 PROCEDURE — 99213 OFFICE O/P EST LOW 20 MIN: CPT | Performed by: FAMILY MEDICINE

## 2019-04-04 PROCEDURE — 99396 PREV VISIT EST AGE 40-64: CPT | Performed by: FAMILY MEDICINE

## 2019-04-04 RX ORDER — LATANOPROST 50 UG/ML
1 SOLUTION/ DROPS OPHTHALMIC NIGHTLY
COMMUNITY

## 2019-04-04 RX ORDER — PANTOPRAZOLE SODIUM 40 MG/1
40 TABLET, DELAYED RELEASE ORAL DAILY
Qty: 90 TABLET | Refills: 3 | Status: SHIPPED | OUTPATIENT
Start: 2019-04-04 | End: 2022-01-20 | Stop reason: ALTCHOICE

## 2019-04-04 RX ORDER — ROSUVASTATIN CALCIUM 10 MG/1
10 TABLET, COATED ORAL DAILY
COMMUNITY
Start: 2019-04-01 | End: 2020-01-20 | Stop reason: SDUPTHER

## 2019-04-04 RX ORDER — CELECOXIB 200 MG/1
200 CAPSULE ORAL DAILY
Qty: 90 CAPSULE | Refills: 3 | Status: SHIPPED | OUTPATIENT
Start: 2019-04-04 | End: 2020-01-20 | Stop reason: SDUPTHER

## 2019-04-04 RX ORDER — MONTELUKAST SODIUM 10 MG/1
10 TABLET ORAL NIGHTLY
Qty: 90 TABLET | Refills: 3 | Status: SHIPPED | OUTPATIENT
Start: 2019-04-04 | End: 2020-01-20 | Stop reason: SDUPTHER

## 2019-04-04 NOTE — PROGRESS NOTES
"Preventive Exam    History of Present Illness: Alice is here for check up and review of routine health maintenance. She states she is doing and we address the following health issues:  Alice has a history of chronic hypertension and has been well controlled on current medications.She is tolerating medications without side effect. She reports no vision changes, headaches or lightheadedness. She is requesting refills of medications.  Alice has a history of chronic hypothyroidism and has been well controlled on current dose of replacement.She report no concerning symptoms: no cold intolerance, fatigue or hair loss.   GERD - this is a chronic problem for her and she does well on Protonix.   Alice has a hx of depression and states that she is getting good relief from symptoms on current medication. This is a chronic problem that is responding well to treatment. She has no intolerable side effects to medication and reports that his helps lift mood and makes daily life, relationships better. No thoughts of self harm expressed.  She has a history of chronic hyperlipidemia and needs lab work today to evaluate response to therapy. She is tolerating medications well without side effects.    REVIEW OF SYSTEMS  Constitutional: Negative.    HENT: Negative.    Eyes: Negative.    Respiratory: Negative.    Cardiovascular: Negative.    Gastrointestinal: Negative.    Endocrine: Negative.    Genitourinary: Negative.    Musculoskeletal: Negative.  Skin: Negative.    Allergic/Immunologic: Negative.    Neurological: Negative.    Hematological: Negative.    Psychiatric/Behavioral: Negative.    All other systems reviewed and are negative.          PHYSICAL EXAM    Vitals:    04/04/19 0925   BP: 124/84   Pulse: 69   Resp: 16   SpO2: 99%   Weight: 103 kg (226 lb)   Height: 172.7 cm (68\")     GENERAL: alert and oriented, afebrile and vital signs stable  HEENT: oral mucosa moist, PEERLA, EOM, conjunctiva normal  No cervical adenopathy  LUNGS: clear " to ascultation bilaterally, no rales, ronchi or wheezing  HEART: RRR S1 S2 without murmers, thrills, rubs or gallops  CHEST WALL: within normal limits, no tenderness  ABDOMEN: WNL. Normal BS.  EXTREMITIES: No clubbing, cyanosis or edema noted. Normal Pulses.  SKIN: warm, dry, no rashes noted  NEURO: CN II- XII grossly intact    ASSESSMENT AND PLAN  Problem List Items Addressed This Visit        Cardiovascular and Mediastinum    Hypertension  Well controlled on current medication, will refill medication today and as needed. She will RTO for repeat B/P check in 6 months.    Relevant Orders    Comprehensive Metabolic Panel       Digestive    GERD (gastroesophageal reflux disease)  Refilled medication for her today.       Endocrine    Hypothyroidism  Well managed on current dose of thyroid replacement. TSH reviewed and on chart.  Well refill medications as needed.    Relevant Orders    TSH      Other Visit Diagnoses     Routine general medical examination at a health care facility    -  Primary    Relevant Orders    CBC (No Diff)    Comprehensive Metabolic Panel    Other hyperlipidemia      This is a chronic problem that has been well managed on current medications. Will refill as needed.     Relevant Medications    rosuvastatin (CRESTOR) 10 MG tablet    Other Relevant Orders    Comprehensive Metabolic Panel    Lipid Panel With / Chol / HDL Ratio        Routine health maintenance reviewed and discussed with Alice.    .  Orders Placed This Encounter   Procedures   • CBC (No Diff)   • Comprehensive Metabolic Panel   • Lipid Panel With / Chol / HDL Ratio   • TSH     Return in about 1 year (around 4/4/2020) for Annual physical.

## 2019-04-04 NOTE — PATIENT INSTRUCTIONS
Annual Wellness  Personal Prevention Plan of Service   I will let you know lab results.   Date of Office Visit:  2019  Encounter Provider:  Paul Lau MD  Place of Service:  [facility] PRIMARY [facility]ARE  Patient Name: Alice Akins  :  1960    As part of the Annual Wellness portion of your visit today, we are providing you with this personalized preventive plan of services (PPPS). This plan is based upon recommendations of the [place] Preventive Services Task Force (USPSTF) and the Advisory Committee on Immunization Practices (ACIP).    This lists the preventive care services that should be considered, and provides dates of when you are due. Items listed as completed are up-to-date and do not require any further intervention.    Health Maintenance   Topic Date Due   • ZOSTER VACCINE (1 of 2) 2010   • INFLUENZA VACCINE  2019   • MAMMOGRAM  2019   • LIPID PANEL  2020   • ANNUAL PHYSICAL  2020   • TDAP/TD VACCINES (3 - Td) 2022   • COLONOSCOPY  2026   • HEPATITIS C SCREENING  Completed   • PAP SMEAR  Discontinued       Orders Placed This Encounter   Procedures   • CBC (No Diff)   • Comprehensive Metabolic Panel   • Lipid Panel With / Chol / HDL Ratio   • TSH       Return in about 1 year (around 2020) for Annual physical.

## 2019-04-05 LAB
ALBUMIN SERPL-MCNC: 4.6 G/DL (ref 3.5–5.2)
ALBUMIN/GLOB SERPL: 1.9 G/DL
ALP SERPL-CCNC: 79 U/L (ref 39–117)
ALT SERPL-CCNC: 18 U/L (ref 1–33)
AST SERPL-CCNC: 23 U/L (ref 1–32)
BILIRUB SERPL-MCNC: 0.5 MG/DL (ref 0.2–1.2)
BUN SERPL-MCNC: 14 MG/DL (ref 6–20)
BUN/CREAT SERPL: 22.6 (ref 7–25)
CALCIUM SERPL-MCNC: 9.9 MG/DL (ref 8.6–10.5)
CHLORIDE SERPL-SCNC: 104 MMOL/L (ref 98–107)
CHOLEST SERPL-MCNC: 149 MG/DL (ref 0–200)
CHOLEST/HDLC SERPL: 2.1 {RATIO}
CO2 SERPL-SCNC: 25.6 MMOL/L (ref 22–29)
CREAT SERPL-MCNC: 0.62 MG/DL (ref 0.57–1)
GLOBULIN SER CALC-MCNC: 2.4 GM/DL
GLUCOSE SERPL-MCNC: 98 MG/DL (ref 65–99)
HDLC SERPL-MCNC: 71 MG/DL (ref 40–60)
LDLC SERPL CALC-MCNC: 63 MG/DL (ref 0–100)
POTASSIUM SERPL-SCNC: 4.7 MMOL/L (ref 3.5–5.2)
PROT SERPL-MCNC: 7 G/DL (ref 6–8.5)
SODIUM SERPL-SCNC: 142 MMOL/L (ref 136–145)
TRIGL SERPL-MCNC: 73 MG/DL (ref 0–150)
TSH SERPL DL<=0.005 MIU/L-ACNC: 0.9 MIU/ML (ref 0.27–4.2)
VLDLC SERPL CALC-MCNC: 14.6 MG/DL

## 2019-04-06 LAB
ERYTHROCYTE [DISTWIDTH] IN BLOOD BY AUTOMATED COUNT: 15.2 % (ref 12.3–15.4)
HCT VFR BLD AUTO: 40 % (ref 34–46.6)
HGB BLD-MCNC: 12 G/DL (ref 12–15.9)
MCH RBC QN AUTO: 26 PG (ref 26.6–33)
MCHC RBC AUTO-ENTMCNC: 30 G/DL (ref 31.5–35.7)
MCV RBC AUTO: 86.6 FL (ref 79–97)
PLATELET # BLD AUTO: 333 10*3/MM3 (ref 140–450)
RBC # BLD AUTO: 4.62 10*6/MM3 (ref 3.77–5.28)
WBC # BLD AUTO: 6.6 10*3/MM3 (ref 3.4–10.8)

## 2019-12-02 RX ORDER — VALACYCLOVIR HYDROCHLORIDE 1 G/1
TABLET, FILM COATED ORAL
Qty: 30 TABLET | Refills: 6 | Status: SHIPPED | OUTPATIENT
Start: 2019-12-02 | End: 2023-02-16

## 2019-12-02 RX ORDER — AZELASTINE 1 MG/ML
2 SPRAY, METERED NASAL 2 TIMES DAILY
Qty: 30 ML | Refills: 11 | Status: SHIPPED | OUTPATIENT
Start: 2019-12-02 | End: 2019-12-03 | Stop reason: SDUPTHER

## 2019-12-03 RX ORDER — AZELASTINE 1 MG/ML
2 SPRAY, METERED NASAL 2 TIMES DAILY
Qty: 30 ML | Refills: 11 | Status: SHIPPED | OUTPATIENT
Start: 2019-12-03 | End: 2019-12-04 | Stop reason: SDUPTHER

## 2019-12-03 RX ORDER — AZELASTINE 1 MG/ML
2 SPRAY, METERED NASAL 2 TIMES DAILY
Qty: 30 ML | Refills: 11 | Status: SHIPPED | OUTPATIENT
Start: 2019-12-03 | End: 2019-12-03 | Stop reason: SDUPTHER

## 2019-12-04 RX ORDER — AZELASTINE 1 MG/ML
2 SPRAY, METERED NASAL 2 TIMES DAILY
Qty: 30 ML | Refills: 5 | Status: SHIPPED | OUTPATIENT
Start: 2019-12-04 | End: 2020-01-20 | Stop reason: SDUPTHER

## 2020-01-20 RX ORDER — MONTELUKAST SODIUM 10 MG/1
10 TABLET ORAL NIGHTLY
Qty: 90 TABLET | Refills: 1 | Status: SHIPPED | OUTPATIENT
Start: 2020-01-20 | End: 2022-02-08 | Stop reason: SDUPTHER

## 2020-01-20 RX ORDER — ROSUVASTATIN CALCIUM 10 MG/1
10 TABLET, COATED ORAL DAILY
Qty: 90 TABLET | Refills: 1 | Status: SHIPPED | OUTPATIENT
Start: 2020-01-20 | End: 2022-02-08 | Stop reason: SDUPTHER

## 2020-01-20 RX ORDER — AMLODIPINE BESYLATE 5 MG/1
5 TABLET ORAL DAILY
Qty: 90 TABLET | Refills: 1 | Status: SHIPPED | OUTPATIENT
Start: 2020-01-20 | End: 2022-02-08 | Stop reason: SDUPTHER

## 2020-01-20 RX ORDER — AZELASTINE 1 MG/ML
2 SPRAY, METERED NASAL 2 TIMES DAILY
Qty: 30 ML | Refills: 5 | Status: SHIPPED | OUTPATIENT
Start: 2020-01-20 | End: 2020-02-21 | Stop reason: SDUPTHER

## 2020-01-20 RX ORDER — CELECOXIB 200 MG/1
200 CAPSULE ORAL DAILY
Qty: 90 CAPSULE | Refills: 3 | Status: SHIPPED | OUTPATIENT
Start: 2020-01-20 | End: 2022-01-20 | Stop reason: ALTCHOICE

## 2020-01-20 RX ORDER — LEVOTHYROXINE SODIUM 0.03 MG/1
25 TABLET ORAL DAILY
Qty: 90 TABLET | Refills: 1 | Status: SHIPPED | OUTPATIENT
Start: 2020-01-20 | End: 2022-02-08 | Stop reason: SDUPTHER

## 2020-01-20 RX ORDER — VALSARTAN 320 MG/1
320 TABLET ORAL DAILY
Qty: 90 TABLET | Refills: 1 | Status: SHIPPED | OUTPATIENT
Start: 2020-01-20 | End: 2020-05-14

## 2020-02-21 RX ORDER — AZELASTINE 1 MG/ML
2 SPRAY, METERED NASAL 2 TIMES DAILY
Qty: 3 EACH | Refills: 1 | Status: SHIPPED | OUTPATIENT
Start: 2020-02-21 | End: 2020-05-14

## 2020-05-14 RX ORDER — VALSARTAN 320 MG/1
TABLET ORAL
Qty: 90 TABLET | Refills: 1 | Status: SHIPPED | OUTPATIENT
Start: 2020-05-14 | End: 2022-01-20 | Stop reason: ALTCHOICE

## 2020-05-14 RX ORDER — AZELASTINE 1 MG/ML
SPRAY, METERED NASAL
Qty: 3 EACH | Refills: 1 | Status: SHIPPED | OUTPATIENT
Start: 2020-05-14

## 2021-03-19 ENCOUNTER — BULK ORDERING (OUTPATIENT)
Dept: CASE MANAGEMENT | Facility: OTHER | Age: 61
End: 2021-03-19

## 2021-03-19 DIAGNOSIS — Z23 IMMUNIZATION DUE: ICD-10-CM

## 2021-12-29 ENCOUNTER — TELEPHONE (OUTPATIENT)
Dept: FAMILY MEDICINE CLINIC | Facility: CLINIC | Age: 61
End: 2021-12-29

## 2021-12-29 NOTE — TELEPHONE ENCOUNTER
PATIENT HAS MOVED BACK INTO THE AREA AND IS REQUESTING TO RE-ESTABLISH CARE WITH DR TOMLINSON    PLEASE ADVISE    PATIENT CAN BE REACHED AT  [phone]

## 2022-01-20 ENCOUNTER — TELEMEDICINE (OUTPATIENT)
Dept: FAMILY MEDICINE CLINIC | Facility: CLINIC | Age: 62
End: 2022-01-20

## 2022-01-20 DIAGNOSIS — E03.9 ACQUIRED HYPOTHYROIDISM: ICD-10-CM

## 2022-01-20 DIAGNOSIS — J30.89 NON-SEASONAL ALLERGIC RHINITIS, UNSPECIFIED TRIGGER: Primary | ICD-10-CM

## 2022-01-20 DIAGNOSIS — E78.2 MIXED HYPERLIPIDEMIA: ICD-10-CM

## 2022-01-20 DIAGNOSIS — I10 PRIMARY HYPERTENSION: ICD-10-CM

## 2022-01-20 DIAGNOSIS — R09.81 NASAL SINUS CONGESTION: ICD-10-CM

## 2022-01-20 PROBLEM — F41.9 ANXIETY AND DEPRESSION: Status: ACTIVE | Noted: 2022-01-20

## 2022-01-20 PROBLEM — F32.A ANXIETY AND DEPRESSION: Status: ACTIVE | Noted: 2022-01-20

## 2022-01-20 PROCEDURE — 99214 OFFICE O/P EST MOD 30 MIN: CPT | Performed by: FAMILY MEDICINE

## 2022-01-20 RX ORDER — VALSARTAN AND HYDROCHLOROTHIAZIDE 320; 25 MG/1; MG/1
1 TABLET, FILM COATED ORAL DAILY
Qty: 30 TABLET | Refills: 11 | Status: SHIPPED | OUTPATIENT
Start: 2022-01-20 | End: 2022-02-08 | Stop reason: SDUPTHER

## 2022-01-20 RX ORDER — ESCITALOPRAM OXALATE 20 MG/1
TABLET ORAL
COMMUNITY
Start: 2022-01-18 | End: 2022-02-08 | Stop reason: SDUPTHER

## 2022-01-20 RX ORDER — BUSPIRONE HYDROCHLORIDE 7.5 MG/1
7.5 TABLET ORAL 2 TIMES DAILY
COMMUNITY
Start: 2021-11-22 | End: 2022-02-08 | Stop reason: SDUPTHER

## 2022-01-20 NOTE — PROGRESS NOTES
Subjective Alice Akins is a 61 y.o. female.   Hypothyroidism, Hypertension, and Hyperlipidemia    History of Present Illness   Alice Adams is on this MyChart video visit today to get refills of prescriptions for chronic medical problems.  She has just returned to [place] and will be following up with me for a physical exam and labs in the next few weeks.  Alice has chronic hypertension and has been well controlled on current medications.She is checking her blood pressure at home and states that it has been stable and is here today for follow up. She is tolerating medications without side effect. She reports no vision changes, headaches or lightheadedness. She is requesting refills of medications.  She has chronic hyperlipidemia and his here today and will come in in the next few weeks for labs to monitor her  response to therapy. She is tolerating medications well without side effects.  Alice has chronic hypothyroidism and has been well controlled on current dose of replacement.She report no concerning symptoms: no cold intolerance, fatigue or hair loss.   Alice has chronic anxiety and depression and states that she is getting good relief from symptoms on current medication, Lexapro 20 mg and BuSpar 7.5 mg. This is a chronic problem that is responding well to treatment. She is here for regular 6 month monitoring of response to therapy and reports no intolerable side effects to medication and reports that this medication helps lift mood and makes daily life, relationships better. No thoughts of self harm expressed.  The following portions of the patient's history were reviewed and updated as appropriate: allergies, current medications, past family history, past medical history, past social history, past surgical history and problem list.    She is also struggling with nonseasonal allergic rhinitis symptoms that are really gotten difficult for her and she is struggling despite taking her medications as prescribed and  over the counter medications as needed.  She is requesting a referral for evaluation of allergies and chronic sinus problems.    Review of Systems   HENT: Positive for congestion, postnasal drip and rhinorrhea.    Neurological: Positive for headache.   Psychiatric/Behavioral: Negative.    All other systems reviewed and are negative.      Objective   Physical Exam  Vitals and nursing note reviewed.   Constitutional:       Appearance: She is well-developed.   HENT:      Head: Normocephalic and atraumatic.   Eyes:      Pupils: Pupils are equal, round, and reactive to light.   Pulmonary:      Effort: Pulmonary effort is normal.   Musculoskeletal:         General: Normal range of motion.   Neurological:      Mental Status: She is alert and oriented to person, place, and time.   Psychiatric:         Mood and Affect: Mood normal.         Behavior: Behavior normal.         Thought Content: Thought content normal.         Judgment: Judgment normal.           Assessment/Plan   Problem List Items Addressed This Visit        Allergies and Adverse Reactions    Non-seasonal allergic rhinitis - Primary    Overview     She has chronic allergic rhinitis and is taking multiple medications to help manage her symptoms.  She is currently taking Alertec over-the-counter, flunisolide nasal spray, azelastine nasal spray, and montelukast to help control symptoms.  She is still struggling with the symptoms.         Relevant Orders    Ambulatory Referral to ENT (Otolaryngology)       Cardiac and Vasculature    Hypertension    Overview     Well controlled on current medication, will refill medication today and as needed. She will RTO for repeat B/P check in 6 months.Amlodipine 5 mg and Valsartan HCTZ 320/25 mg a day.         Relevant Medications    valsartan-hydrochlorothiazide (DIOVAN-HCT) 320-25 MG per tablet    Mixed hyperlipidemia    Overview     She has been well controlled on Crestor 10 mg daily and was advised to continue a healthy low  fat diet, include regular exercise and take medications as prescribed.She  will RTO in 6 months for regularly scheduled follow up to evaluate response to therapy and make medication adjustments as needed.             Endocrine and Metabolic    Hypothyroidism    Overview     Will check lipid panel today and She was advised to continue a healthy low fat diet, include regular exercise and take medications as prescribed.Patient will RTO in 6 months for regularly scheduled follow up to evaluate response to therapy and make medication adjustments as needed. Levothyroxine 25 mcg a day.           Other Visit Diagnoses     Nasal sinus congestion        Relevant Orders    Ambulatory Referral to ENT (Otolaryngology)               Return in about 2 weeks (around 2/3/2022) for Annual physical.

## 2022-02-08 DIAGNOSIS — I10 PRIMARY HYPERTENSION: ICD-10-CM

## 2022-02-08 RX ORDER — VALSARTAN AND HYDROCHLOROTHIAZIDE 320; 25 MG/1; MG/1
1 TABLET, FILM COATED ORAL DAILY
Qty: 90 TABLET | Refills: 1 | Status: SHIPPED | OUTPATIENT
Start: 2022-02-08 | End: 2023-01-16

## 2022-02-08 RX ORDER — ESCITALOPRAM OXALATE 20 MG/1
20 TABLET ORAL DAILY
Qty: 90 TABLET | Refills: 1 | Status: SHIPPED | OUTPATIENT
Start: 2022-02-08 | End: 2023-04-06

## 2022-02-08 RX ORDER — MONTELUKAST SODIUM 10 MG/1
10 TABLET ORAL NIGHTLY
Qty: 90 TABLET | Refills: 1 | Status: SHIPPED | OUTPATIENT
Start: 2022-02-08 | End: 2023-04-06

## 2022-02-08 RX ORDER — AMLODIPINE BESYLATE 5 MG/1
5 TABLET ORAL DAILY
Qty: 90 TABLET | Refills: 1 | Status: SHIPPED | OUTPATIENT
Start: 2022-02-08 | End: 2022-06-13

## 2022-02-08 RX ORDER — BUSPIRONE HYDROCHLORIDE 7.5 MG/1
7.5 TABLET ORAL 2 TIMES DAILY
Qty: 90 TABLET | Refills: 1 | Status: SHIPPED | OUTPATIENT
Start: 2022-02-08 | End: 2022-03-10

## 2022-02-08 RX ORDER — ROSUVASTATIN CALCIUM 10 MG/1
10 TABLET, COATED ORAL DAILY
Qty: 90 TABLET | Refills: 1 | Status: SHIPPED | OUTPATIENT
Start: 2022-02-08 | End: 2022-06-13

## 2022-02-08 RX ORDER — LEVOTHYROXINE SODIUM 0.03 MG/1
25 TABLET ORAL DAILY
Qty: 90 TABLET | Refills: 1 | Status: SHIPPED | OUTPATIENT
Start: 2022-02-08 | End: 2022-06-13

## 2022-03-10 ENCOUNTER — OFFICE VISIT (OUTPATIENT)
Dept: FAMILY MEDICINE CLINIC | Facility: CLINIC | Age: 62
End: 2022-03-10

## 2022-03-10 VITALS
WEIGHT: 218 LBS | SYSTOLIC BLOOD PRESSURE: 112 MMHG | HEIGHT: 67 IN | BODY MASS INDEX: 34.21 KG/M2 | OXYGEN SATURATION: 99 % | DIASTOLIC BLOOD PRESSURE: 60 MMHG | HEART RATE: 78 BPM | RESPIRATION RATE: 16 BRPM

## 2022-03-10 DIAGNOSIS — B00.2 RECURRENT ORAL HERPES SIMPLEX: ICD-10-CM

## 2022-03-10 DIAGNOSIS — Z00.00 ROUTINE ADULT HEALTH MAINTENANCE: Primary | ICD-10-CM

## 2022-03-10 DIAGNOSIS — E78.2 MIXED HYPERLIPIDEMIA: ICD-10-CM

## 2022-03-10 DIAGNOSIS — E03.9 ACQUIRED HYPOTHYROIDISM: ICD-10-CM

## 2022-03-10 DIAGNOSIS — Z78.0 POSTMENOPAUSAL: ICD-10-CM

## 2022-03-10 DIAGNOSIS — Z23 NEED FOR VACCINATION: ICD-10-CM

## 2022-03-10 DIAGNOSIS — I10 PRIMARY HYPERTENSION: ICD-10-CM

## 2022-03-10 DIAGNOSIS — J30.89 NON-SEASONAL ALLERGIC RHINITIS, UNSPECIFIED TRIGGER: ICD-10-CM

## 2022-03-10 DIAGNOSIS — R92.8 ABNORMAL MAMMOGRAM: ICD-10-CM

## 2022-03-10 PROCEDURE — 90471 IMMUNIZATION ADMIN: CPT | Performed by: FAMILY MEDICINE

## 2022-03-10 PROCEDURE — 99396 PREV VISIT EST AGE 40-64: CPT | Performed by: FAMILY MEDICINE

## 2022-03-10 PROCEDURE — 90750 HZV VACC RECOMBINANT IM: CPT | Performed by: FAMILY MEDICINE

## 2022-03-10 PROCEDURE — 99214 OFFICE O/P EST MOD 30 MIN: CPT | Performed by: FAMILY MEDICINE

## 2022-03-10 RX ORDER — BUSPIRONE HYDROCHLORIDE 15 MG/1
15 TABLET ORAL 2 TIMES DAILY
COMMUNITY
Start: 2022-02-17 | End: 2023-04-06

## 2022-03-10 NOTE — PROGRESS NOTES
Preventive Exam    History of Present Illness: Alice is here for check up and review of routine health maintenance. She states she is doing well and has no concerns.  Alice has chronic hypertension and has been well controlled on current medications.She  is monitored by me every 6 months and is here today for follow up. She is tolerating medications without side effect. She reports no vision changes, headaches or lightheadedness. She is requesting refills of medications.  Alice has chronic hypothyroidism and has been well controlled on current dose of replacement.She report no concerning symptoms: no cold intolerance, fatigue or hair loss.   She has chronic hyperlipidemia and his here today for regular 6 month monitoring of response to therapy. Lab work will be checked today. She is tolerating medications well without side effects.  She has chronic seasonal allergies and is doing well with over-the-counter medication and azelastine nasal spray and Nasalide spray.  She is also using montelukast.  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  She has chronic anxiety and depression and is being seen by psychiatrist Elaina Penny.  Mammogram:ordered  Colon cancer screening:UTD  Tobacco use :remote  Bone Density:ordered      REVIEW OF SYSTEMS  Constitutional: Negative.    HENT: Negative.    Eyes: Negative.    Respiratory: Negative.    Cardiovascular: Negative.    Gastrointestinal: Negative.    Endocrine: Negative.    Genitourinary: Negative.    Musculoskeletal: Negative.  Skin: Negative.    Allergic/Immunologic: Negative.    Neurological: Negative.    Hematological: Negative.    Psychiatric/Behavioral: Negative.    I have reviewed the ROS as documented by the MA. Paul Lua MD       PHYSICAL EXAM    Vitals:    03/10/22 1356   BP: 112/60   Pulse: 78   Resp: 16   SpO2: 99%   Weight: 98.9 kg (218 lb)   Height: 170.2 cm  "(67\")     GENERAL: alert and oriented, afebrile and vital signs stable  HEENT: oral mucosa moist, PEERLA, EOM, conjunctiva normal  No cervical adenopathy  LUNGS: clear to ascultation bilaterally, no rales, ronchi or wheezing  HEART: RRR S1 S2 without murmers, thrills, rubs or gallops  CHEST WALL: within normal limits, no tenderness  ABDOMEN: WNL. Normal BS.  EXTREMITIES: No clubbing, cyanosis or edema noted. Normal Pulses.  SKIN: warm, dry, no rashes noted  NEURO: CN II- XII grossly intact  PSYCH: Good mood and positive affect  ASSESSMENT AND PLAN  Problem List Items Addressed This Visit        Allergies and Adverse Reactions    Non-seasonal allergic rhinitis    Overview     She has chronic allergic rhinitis and is taking multiple medications to help manage her symptoms.  She is currently taking Alertec over-the-counter, flunisolide nasal spray, azelastine nasal spray, and montelukast to help control symptoms.  She is still struggling with the symptoms.              Cardiac and Vasculature    Primary hypertension    Overview     Well controlled on current medication, will refill medication today and as needed. She will RTO for repeat B/P check in 6 months.Amlodipine 5 mg and Valsartan HCTZ 320/25 mg a day.           Mixed hyperlipidemia    Overview     She has been well controlled on Crestor 10 mg daily and was advised to continue a healthy low fat diet, include regular exercise and take medications as prescribed.She  will RTO in 6 months for regularly scheduled follow up to evaluate response to therapy and make medication adjustments as needed.               Endocrine and Metabolic    Hypothyroidism    Overview     Will check lipid panel today and She was advised to continue a healthy low fat diet, include regular exercise and take medications as prescribed.Patient will RTO in 6 months for regularly scheduled follow up to evaluate response to therapy and make medication adjustments as needed. Levothyroxine 25 mcg a " day.              Other    Recurrent oral herpes simplex  Will refill valacyclovir as needed.      Other Visit Diagnoses     Routine adult health maintenance    -  Primary        Routine health maintenance reviewed and discussed with Alice.  The patient was counseled regarding a healthy diet and exercise, appropriate routine screening and immunizations and encouraged to get regular dental and eye exams .  Return in about 6 months (around 9/10/2022) for Recheck.

## 2022-03-11 LAB
ALBUMIN SERPL-MCNC: 4.2 G/DL (ref 3.8–4.8)
ALBUMIN/GLOB SERPL: 2 {RATIO} (ref 1.2–2.2)
ALP SERPL-CCNC: 78 IU/L (ref 44–121)
ALT SERPL-CCNC: 12 IU/L (ref 0–32)
AST SERPL-CCNC: 22 IU/L (ref 0–40)
BILIRUB SERPL-MCNC: <0.2 MG/DL (ref 0–1.2)
BUN SERPL-MCNC: 14 MG/DL (ref 8–27)
BUN/CREAT SERPL: 18 (ref 12–28)
CALCIUM SERPL-MCNC: 9.2 MG/DL (ref 8.7–10.3)
CHLORIDE SERPL-SCNC: 102 MMOL/L (ref 96–106)
CHOLEST SERPL-MCNC: 147 MG/DL (ref 100–199)
CHOLEST/HDLC SERPL: 2.3 RATIO (ref 0–4.4)
CO2 SERPL-SCNC: 21 MMOL/L (ref 20–29)
CREAT SERPL-MCNC: 0.77 MG/DL (ref 0.57–1)
EGFR GENE MUT ANL BLD/T: 88 ML/MIN/1.73
ERYTHROCYTE [DISTWIDTH] IN BLOOD BY AUTOMATED COUNT: 13.1 % (ref 11.7–15.4)
GLOBULIN SER CALC-MCNC: 2.1 G/DL (ref 1.5–4.5)
GLUCOSE SERPL-MCNC: 124 MG/DL (ref 65–99)
HCT VFR BLD AUTO: 37.8 % (ref 34–46.6)
HDLC SERPL-MCNC: 64 MG/DL
HGB BLD-MCNC: 12.3 G/DL (ref 11.1–15.9)
LDLC SERPL CALC-MCNC: 60 MG/DL (ref 0–99)
MCH RBC QN AUTO: 28.1 PG (ref 26.6–33)
MCHC RBC AUTO-ENTMCNC: 32.5 G/DL (ref 31.5–35.7)
MCV RBC AUTO: 86 FL (ref 79–97)
PLATELET # BLD AUTO: 308 X10E3/UL (ref 150–450)
POTASSIUM SERPL-SCNC: 3.7 MMOL/L (ref 3.5–5.2)
PROT SERPL-MCNC: 6.3 G/DL (ref 6–8.5)
RBC # BLD AUTO: 4.38 X10E6/UL (ref 3.77–5.28)
SODIUM SERPL-SCNC: 144 MMOL/L (ref 134–144)
TRIGL SERPL-MCNC: 134 MG/DL (ref 0–149)
TSH SERPL DL<=0.005 MIU/L-ACNC: 0.74 UIU/ML (ref 0.45–4.5)
VLDLC SERPL CALC-MCNC: 23 MG/DL (ref 5–40)
WBC # BLD AUTO: 7.6 X10E3/UL (ref 3.4–10.8)

## 2022-03-11 NOTE — PATIENT INSTRUCTIONS
Annual Wellness  Personal Prevention Plan of Service     Date of Office Visit:    Encounter Provider:  Paul Lau MD  Place of Service:  [facility]  Patient Name: Alice Akins  :  1960    As part of the Annual Wellness portion of your visit today, we are providing you with this personalized preventive plan of services (PPPS). This plan is based upon recommendations of the [place] Preventive Services Task Force (USPSTF) and the Advisory Committee on Immunization Practices (ACIP).    This lists the preventive care services that should be considered, and provides dates of when you are due. Items listed as completed are up-to-date and do not require any further intervention.    Health Maintenance   Topic Date Due    MAMMOGRAM  2021    ZOSTER VACCINE (2 of 2) 2022    TDAP/TD VACCINES (3 - Td or Tdap) 2022    LIPID PANEL  03/10/2023    ANNUAL PHYSICAL  2023    COLORECTAL CANCER SCREENING  2026    HEPATITIS C SCREENING  Completed    COVID-19 Vaccine  Completed    INFLUENZA VACCINE  Completed    Pneumococcal Vaccine 0-64  Aged Out    PAP SMEAR  Discontinued       Orders Placed This Encounter   Procedures    DEXA Bone Density Axial     Standing Status:   Future     Standing Expiration Date:   3/10/2023     Order Specific Question:   Is patient taking or have taken long term Glucocorticoid (steroids)?     Answer:   No     Order Specific Question:   Does the patient have rheumatoid arthritis?     Answer:   No     Order Specific Question:   Does the patient have secondary osteoporosis?     Answer:   No     Order Specific Question:   Reason for Exam:     Answer:   post menopausal     Order Specific Question:   Release to patient     Answer:   Immediate    Mammo diagnostic digital tomosynthesis bilateral w CAD     Standing Status:   Future     Standing Expiration Date:   3/10/2023     Scheduling Instructions:      [facility]  "everwhere     Order Specific Question:   Is an Ultrasound Breast required?  If 'Yes\", Please enter an order for the US Breast as well.     Answer:   Yes     Order Specific Question:   Reason for Exam:     Answer:   abnormal mammo     Order Specific Question:   Release to patient     Answer:   Immediate    US breast right complete     Standing Status:   Future     Standing Expiration Date:   3/10/2023     Scheduling Instructions:      Results in care everywhere [facility]     Order Specific Question:   Reason for Exam:     Answer:   abn mammo     Order Specific Question:   Release to patient     Answer:   Immediate    Shingrix Vaccine    Comprehensive Metabolic Panel     Order Specific Question:   Release to patient     Answer:   Immediate    CBC (No Diff)     Order Specific Question:   Release to patient     Answer:   Immediate    Lipid Panel With / Chol / HDL Ratio     Order Specific Question:   Release to patient     Answer:   Immediate    TSH     Order Specific Question:   Release to patient     Answer:   Immediate       Return in about 6 months (around 9/10/2022) for Recheck.          "

## 2022-03-29 ENCOUNTER — APPOINTMENT (OUTPATIENT)
Dept: CT IMAGING | Facility: HOSPITAL | Age: 62
End: 2022-03-29

## 2022-03-29 ENCOUNTER — HOSPITAL ENCOUNTER (EMERGENCY)
Facility: HOSPITAL | Age: 62
Discharge: HOME | End: 2022-03-29
Attending: EMERGENCY MEDICINE | Admitting: EMERGENCY MEDICINE

## 2022-03-29 VITALS
DIASTOLIC BLOOD PRESSURE: 70 MMHG | BODY MASS INDEX: 33.74 KG/M2 | SYSTOLIC BLOOD PRESSURE: 131 MMHG | TEMPERATURE: 97.5 F | HEIGHT: 67 IN | OXYGEN SATURATION: 99 % | HEART RATE: 71 BPM | RESPIRATION RATE: 16 BRPM | WEIGHT: 215 LBS

## 2022-03-29 DIAGNOSIS — S00.83XA FACIAL HEMATOMA, INITIAL ENCOUNTER: ICD-10-CM

## 2022-03-29 DIAGNOSIS — S05.11XA TRAUMATIC ORBITAL HEMATOMA, RIGHT, INITIAL ENCOUNTER: Primary | ICD-10-CM
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
## 2022-03-29 PROCEDURE — 90471 IMMUNIZATION ADMIN: CPT | Performed by: NURSE PRACTITIONER

## 2022-03-29 PROCEDURE — 90715 TDAP VACCINE 7 YRS/> IM: CPT | Performed by: NURSE PRACTITIONER

## 2022-03-29 PROCEDURE — 70450 CT HEAD/BRAIN W/O DYE: CPT

## 2022-03-29 PROCEDURE — 99282 EMERGENCY DEPT VISIT SF MDM: CPT

## 2022-03-29 PROCEDURE — 70486 CT MAXILLOFACIAL W/O DYE: CPT

## 2022-03-29 PROCEDURE — 25010000002 TETANUS-DIPHTH-ACELL PERTUSSIS 5-2.5-18.5 LF-MCG/0.5 SUSPENSION PREFILLED SYRINGE: Performed by: NURSE PRACTITIONER

## 2022-03-29 RX ORDER — SPIRONOLACT/HYDROCHLOROTHIAZID 25 MG-25MG
TABLET ORAL
COMMUNITY

## 2022-03-29 RX ADMIN — TETANUS TOXOID, REDUCED DIPHTHERIA TOXOID AND ACELLULAR PERTUSSIS VACCINE, ADSORBED 0.5 ML: 5; 2.5; 8; 8; 2.5 SUSPENSION INTRAMUSCULAR at 15:46

## 2022-04-22 RX ORDER — CLINDAMYCIN HYDROCHLORIDE 300 MG/1
CAPSULE ORAL
Qty: 30 CAPSULE | Refills: 0 | Status: SHIPPED | OUTPATIENT
Start: 2022-04-22

## 2022-05-14 ENCOUNTER — PATIENT MESSAGE (OUTPATIENT)
Dept: FAMILY MEDICINE CLINIC | Facility: CLINIC | Age: 62
End: 2022-05-14

## 2022-05-16 DIAGNOSIS — J30.89 NON-SEASONAL ALLERGIC RHINITIS, UNSPECIFIED TRIGGER: Primary | ICD-10-CM

## 2022-05-16 RX ORDER — CETIRIZINE HYDROCHLORIDE, PSEUDOEPHEDRINE HYDROCHLORIDE 5; 120 MG/1; MG/1
1 TABLET, FILM COATED, EXTENDED RELEASE ORAL 2 TIMES DAILY
Qty: 96 TABLET | Refills: 3 | Status: SHIPPED | OUTPATIENT
Start: 2022-05-16 | End: 2022-06-13 | Stop reason: SDUPTHER

## 2022-06-13 DIAGNOSIS — J30.89 NON-SEASONAL ALLERGIC RHINITIS, UNSPECIFIED TRIGGER: ICD-10-CM

## 2022-06-13 RX ORDER — LEVOTHYROXINE SODIUM 0.03 MG/1
TABLET ORAL
Qty: 90 TABLET | Refills: 1 | Status: SHIPPED | OUTPATIENT
Start: 2022-06-13

## 2022-06-13 RX ORDER — ROSUVASTATIN CALCIUM 10 MG/1
10 TABLET, COATED ORAL DAILY
Qty: 90 TABLET | Refills: 1 | Status: SHIPPED | OUTPATIENT
Start: 2022-06-13 | End: 2023-01-16

## 2022-06-13 RX ORDER — AMLODIPINE BESYLATE 5 MG/1
5 TABLET ORAL DAILY
Qty: 90 TABLET | Refills: 1 | Status: SHIPPED | OUTPATIENT
Start: 2022-06-13 | End: 2023-01-16

## 2022-06-13 RX ORDER — CETIRIZINE HYDROCHLORIDE, PSEUDOEPHEDRINE HYDROCHLORIDE 5; 120 MG/1; MG/1
1 TABLET, FILM COATED, EXTENDED RELEASE ORAL 2 TIMES DAILY
Qty: 96 TABLET | Refills: 3 | Status: SHIPPED | OUTPATIENT
Start: 2022-06-13

## 2022-09-06 ENCOUNTER — APPOINTMENT (OUTPATIENT)
Dept: OTHER | Facility: HOSPITAL | Age: 62
End: 2022-09-06

## 2022-09-06 ENCOUNTER — APPOINTMENT (OUTPATIENT)
Dept: ULTRASOUND IMAGING | Facility: HOSPITAL | Age: 62
End: 2022-09-06

## 2022-09-06 ENCOUNTER — HOSPITAL ENCOUNTER (OUTPATIENT)
Dept: MAMMOGRAPHY | Facility: HOSPITAL | Age: 62
Discharge: HOME | End: 2022-09-06

## 2022-09-06 DIAGNOSIS — Z09 FOLLOW-UP EXAM: ICD-10-CM

## 2022-09-06 DIAGNOSIS — R92.8 ABNORMAL MAMMOGRAM: ICD-10-CM

## 2022-09-06 PROCEDURE — 77066 DX MAMMO INCL CAD BI: CPT

## 2022-09-06 PROCEDURE — G0279 TOMOSYNTHESIS, MAMMO: HCPCS

## 2022-09-23 ENCOUNTER — HOSPITAL ENCOUNTER (OUTPATIENT)
Dept: BONE DENSITY | Facility: HOSPITAL | Age: 62
Discharge: HOME | End: 2022-09-23
Admitting: FAMILY MEDICINE

## 2022-09-23 DIAGNOSIS — Z78.0 POSTMENOPAUSAL: ICD-10-CM

## 2022-09-23 PROCEDURE — 77080 DXA BONE DENSITY AXIAL: CPT

## 2022-09-27 ENCOUNTER — TELEPHONE (OUTPATIENT)
Dept: FAMILY MEDICINE CLINIC | Facility: CLINIC | Age: 62
End: 2022-09-27

## 2022-09-27 NOTE — TELEPHONE ENCOUNTER
Patient asking that we put in a referral for a osteoprosis specialist. She thought one had already been put in for her.

## 2022-10-05 ENCOUNTER — HOSPITAL ENCOUNTER (OUTPATIENT)
Dept: GENERAL RADIOLOGY | Facility: HOSPITAL | Age: 62
Discharge: HOME | End: 2022-10-05

## 2022-10-05 ENCOUNTER — TRANSCRIBE ORDERS (OUTPATIENT)
Dept: CARDIOLOGY | Facility: HOSPITAL | Age: 62
End: 2022-10-05

## 2022-10-05 ENCOUNTER — HOSPITAL ENCOUNTER (OUTPATIENT)
Dept: CARDIOLOGY | Facility: HOSPITAL | Age: 62
Discharge: HOME | End: 2022-10-05

## 2022-10-05 ENCOUNTER — LAB (OUTPATIENT)
Dept: LAB | Facility: HOSPITAL | Age: 62
End: 2022-10-05

## 2022-10-05 ENCOUNTER — TRANSCRIBE ORDERS (OUTPATIENT)
Dept: ADMINISTRATIVE | Facility: HOSPITAL | Age: 62
End: 2022-10-05

## 2022-10-05 DIAGNOSIS — M25.561 RIGHT KNEE PAIN, UNSPECIFIED CHRONICITY: ICD-10-CM

## 2022-10-05 DIAGNOSIS — M25.561 RIGHT KNEE PAIN, UNSPECIFIED CHRONICITY: Primary | ICD-10-CM

## 2022-10-05 DIAGNOSIS — Z01.818 PREOP EXAMINATION: ICD-10-CM

## 2022-10-05 DIAGNOSIS — Z01.811 PRE-OP CHEST EXAM: Primary | ICD-10-CM

## 2022-10-05 LAB
ALBUMIN SERPL-MCNC: 4.5 G/DL (ref 3.5–5.2)
ALBUMIN/GLOB SERPL: 2 G/DL
ALP SERPL-CCNC: 75 U/L (ref 39–117)
ALT SERPL W P-5'-P-CCNC: 18 U/L (ref 1–33)
ANION GAP SERPL CALCULATED.3IONS-SCNC: 11.3 MMOL/L (ref 5–15)
AST SERPL-CCNC: 24 U/L (ref 1–32)
BASOPHILS # BLD AUTO: 0.07 10*3/MM3 (ref 0–0.2)
BASOPHILS NFR BLD AUTO: 1.2 % (ref 0–1.5)
BILIRUB SERPL-MCNC: 0.6 MG/DL (ref 0–1.2)
BILIRUB UR QL STRIP: NEGATIVE
BUN SERPL-MCNC: 9 MG/DL (ref 8–23)
BUN/CREAT SERPL: 13.8 (ref 7–25)
CALCIUM SPEC-SCNC: 9.5 MG/DL (ref 8.6–10.5)
CHLORIDE SERPL-SCNC: 102 MMOL/L (ref 98–107)
CLARITY UR: CLEAR
CO2 SERPL-SCNC: 26.7 MMOL/L (ref 22–29)
COLOR UR: YELLOW
CREAT SERPL-MCNC: 0.65 MG/DL (ref 0.57–1)
DEPRECATED RDW RBC AUTO: 39.9 FL (ref 37–54)
EGFRCR SERPLBLD CKD-EPI 2021: 99.7 ML/MIN/1.73
EOSINOPHIL # BLD AUTO: 0.09 10*3/MM3 (ref 0–0.4)
EOSINOPHIL NFR BLD AUTO: 1.5 % (ref 0.3–6.2)
ERYTHROCYTE [DISTWIDTH] IN BLOOD BY AUTOMATED COUNT: 12.7 % (ref 12.3–15.4)
GLOBULIN UR ELPH-MCNC: 2.2 GM/DL
GLUCOSE SERPL-MCNC: 99 MG/DL (ref 65–99)
GLUCOSE UR STRIP-MCNC: NEGATIVE MG/DL
HCT VFR BLD AUTO: 38.8 % (ref 34–46.6)
HGB BLD-MCNC: 13.1 G/DL (ref 12–15.9)
HGB UR QL STRIP.AUTO: NEGATIVE
IMM GRANULOCYTES # BLD AUTO: 0.02 10*3/MM3 (ref 0–0.05)
IMM GRANULOCYTES NFR BLD AUTO: 0.3 % (ref 0–0.5)
KETONES UR QL STRIP: NEGATIVE
LEUKOCYTE ESTERASE UR QL STRIP.AUTO: NEGATIVE
LYMPHOCYTES # BLD AUTO: 1.77 10*3/MM3 (ref 0.7–3.1)
LYMPHOCYTES NFR BLD AUTO: 30.3 % (ref 19.6–45.3)
MCH RBC QN AUTO: 28.6 PG (ref 26.6–33)
MCHC RBC AUTO-ENTMCNC: 33.8 G/DL (ref 31.5–35.7)
MCV RBC AUTO: 84.7 FL (ref 79–97)
MONOCYTES # BLD AUTO: 0.49 10*3/MM3 (ref 0.1–0.9)
MONOCYTES NFR BLD AUTO: 8.4 % (ref 5–12)
NEUTROPHILS NFR BLD AUTO: 3.4 10*3/MM3 (ref 1.7–7)
NEUTROPHILS NFR BLD AUTO: 58.3 % (ref 42.7–76)
NITRITE UR QL STRIP: NEGATIVE
NRBC BLD AUTO-RTO: 0 /100 WBC (ref 0–0.2)
PH UR STRIP.AUTO: 7.5 [PH] (ref 5–8)
PLATELET # BLD AUTO: 316 10*3/MM3 (ref 140–450)
PMV BLD AUTO: 9.3 FL (ref 6–12)
POTASSIUM SERPL-SCNC: 3.8 MMOL/L (ref 3.5–5.2)
PROT SERPL-MCNC: 6.7 G/DL (ref 6–8.5)
PROT UR QL STRIP: NEGATIVE
QT INTERVAL: 385 MS
RBC # BLD AUTO: 4.58 10*6/MM3 (ref 3.77–5.28)
SODIUM SERPL-SCNC: 140 MMOL/L (ref 136–145)
SP GR UR STRIP: 1.01 (ref 1–1.03)
UROBILINOGEN UR QL STRIP: NORMAL
WBC NRBC COR # BLD: 5.84 10*3/MM3 (ref 3.4–10.8)

## 2022-10-05 PROCEDURE — 80053 COMPREHEN METABOLIC PANEL: CPT

## 2022-10-05 PROCEDURE — 93010 ELECTROCARDIOGRAM REPORT: CPT | Performed by: INTERNAL MEDICINE

## 2022-10-05 PROCEDURE — 36415 COLL VENOUS BLD VENIPUNCTURE: CPT

## 2022-10-05 PROCEDURE — 71046 X-RAY EXAM CHEST 2 VIEWS: CPT

## 2022-10-05 PROCEDURE — 81003 URINALYSIS AUTO W/O SCOPE: CPT

## 2022-10-05 PROCEDURE — 85025 COMPLETE CBC W/AUTO DIFF WBC: CPT

## 2022-10-05 PROCEDURE — 93005 ELECTROCARDIOGRAM TRACING: CPT

## 2022-10-11 ENCOUNTER — OFFICE VISIT (OUTPATIENT)
Dept: CARDIOLOGY | Facility: CLINIC | Age: 62
End: 2022-10-11

## 2022-10-11 VITALS
HEIGHT: 67 IN | OXYGEN SATURATION: 97 % | WEIGHT: 229.4 LBS | SYSTOLIC BLOOD PRESSURE: 117 MMHG | DIASTOLIC BLOOD PRESSURE: 82 MMHG | BODY MASS INDEX: 36 KG/M2 | HEART RATE: 90 BPM

## 2022-10-11 DIAGNOSIS — F41.9 ANXIETY AND DEPRESSION: ICD-10-CM

## 2022-10-11 DIAGNOSIS — E78.2 MIXED HYPERLIPIDEMIA: ICD-10-CM

## 2022-10-11 DIAGNOSIS — I10 PRIMARY HYPERTENSION: Primary | ICD-10-CM

## 2022-10-11 DIAGNOSIS — F32.A ANXIETY AND DEPRESSION: ICD-10-CM

## 2022-10-11 DIAGNOSIS — R94.31 ABNORMAL EKG: ICD-10-CM

## 2022-10-11 DIAGNOSIS — E03.9 ACQUIRED HYPOTHYROIDISM: ICD-10-CM

## 2022-10-11 PROCEDURE — 93000 ELECTROCARDIOGRAM COMPLETE: CPT | Performed by: STUDENT IN AN ORGANIZED HEALTH CARE EDUCATION/TRAINING PROGRAM

## 2022-10-11 PROCEDURE — 99204 OFFICE O/P NEW MOD 45 MIN: CPT | Performed by: STUDENT IN AN ORGANIZED HEALTH CARE EDUCATION/TRAINING PROGRAM

## 2022-10-11 NOTE — PATIENT INSTRUCTIONS
We will obtain your previous stress testing results from [place], regardless, no further cardiac testing is required prior to your upcoming knee surgery.

## 2022-10-11 NOTE — PROGRESS NOTES
"    Subjective:     Encounter Date:10/11/22      Patient ID: Alice Akins is a 62 y.o. female.    Chief Complaint:   Chief Complaint   Patient presents with   • Abnormal ECG     Rt TKR w Dr. Land on 10/12/22      History of Present Illness    Ms. Akins is a pleasant 62-year-old lady past medical history hypertension, hyperlipidemia, prior tobacco abuse who presents to establish care after preoperative cardiac testing revealed an abnormal EKG.  She is set to undergo a right TKA upcoming.    Currently, she denies cardiac complaints.  She does report some exertional shortness of breath which has slightly persisted since her COVID infection in January, however she attributes that to some weight gain and decreased functional capacity due to right knee pain.  Currently, she is able to escalate a flight of stairs without stopping.  She is able to do her household chores without stopping due to shortness of breath, and otherwise reports a good functional tolerance.    She reports she has seen cardiologist previously due to abnormal EKGs.  It was first noted in 2004 before hysterectomy, and she had normal stress test at that time.  She has since then had numerous normal stress test due to EKGs to keep revealing \"inferior infarct pattern.\"  On further review, findings could be consistent with left anterior fascicular block.    She briefly moved to [place], and will establish with a cardiologist there.  Her last evaluation was 1 year ago, at which time she was noted to have a coronary calcium score that is mildly elevated, although report directly not available for review at this time.  She also had a normal stress test in 2021 as well, although the direct report of the stress test is not available for review, it was reported normal and clinic note.    She states she quit smoking 20 years ago.  She otherwise denies active cardiac complaints.  And reports compliance with her rosuvastatin 10.  She has not taken daily " "aspirin    The following portions of the patient's history were reviewed and updated as appropriate: allergies, current medications, past family history, past medical history, past social history, past surgical history and problem list.    Past Medical History:   Diagnosis Date   • Allergic Ongoing    Seasonal   • Allergic rhinitis    • Arthritis 2004   • Colon polyp 2013   • Depression    • GERD (gastroesophageal reflux disease)    • Glaucoma    • Hypertension    • Hypothyroidism    • Infectious viral hepatitis 1990s    Hepatitis B   • Joint pain    • Mixed hyperlipidemia 1/20/2022   • Obesity    • Thumb pain    • Wrist pain        Past Surgical History:   Procedure Laterality Date   • COLONOSCOPY     • COSMETIC SURGERY  1990s    Remove birthmark   • FOOT SURGERY Bilateral    • HYSTERECTOMY     • JOINT REPLACEMENT      Lt TKR   • KNEE ARTHROSCOPY Left            ECG 12 Lead    Date/Time: 10/11/2022 9:54 AM  Performed by: Paolo Mendez MD  Authorized by: Paolo Mendez MD   Comparison: compared with previous ECG from 10/5/2022  Similar to previous ECG  Rhythm: sinus rhythm  Rate: normal  Conduction: left anterior fascicular block  ST Segments: ST segments normal  T Waves: T waves normal  QRS axis: left  Other: no other findings    Clinical impression: abnormal EKG  Comments: Possible inferior infarct pattern, however appears more consistent with left anterior fascicular block               Objective:     Vitals:    10/11/22 0906   BP: 117/82   BP Location: Right arm   Patient Position: Sitting   Cuff Size: Adult   Pulse: 90   SpO2: 97%   Weight: 104 kg (229 lb 6.4 oz)   Height: 170.2 cm (67\")         Constitutional:       Appearance: Healthy appearance. Not in distress.   Neck:      Vascular: JVD normal.   Pulmonary:      Effort: Pulmonary effort is normal.      Breath sounds: Normal breath sounds.   Cardiovascular:      PMI at left midclavicular line. Normal rate. Regular rhythm. Normal S2.      Murmurs: There is " no murmur.   Pulses:     Intact distal pulses.   Edema:     Peripheral edema absent.   Skin:     General: Skin is warm and dry.   Neurological:      General: No focal deficit present.      Mental Status: Alert, oriented to person, place, and time and oriented to person, place and time.   Psychiatric:         Mood and Affect: Mood and affect normal.         Lab Review:     Lipid Panel    Lipid Panel 3/10/22   Total Cholesterol 147   Triglycerides 134   HDL Cholesterol 64   VLDL Cholesterol 23   LDL Cholesterol  60           BUN   Date Value Ref Range Status   10/05/2022 9 8 - 23 mg/dL Final   2018 16 6 - 25 mg/dL Final     Creatinine   Date Value Ref Range Status   10/05/2022 0.65 0.57 - 1.00 mg/dL Final   2018 0.67 0.40 - 1.00 mg/dL Final     Comment:     Note new normal range.  IDMS-traceable method     Potassium   Date Value Ref Range Status   10/05/2022 3.8 3.5 - 5.2 mmol/L Final   2018 3.9 3.5 - 5.1 mmol/L Final     ALT (SGPT)   Date Value Ref Range Status   10/05/2022 18 1 - 33 U/L Final   2018 26 0 - 60 U/L Final     Comment:     Note new normal range.     AST (SGOT)   Date Value Ref Range Status   10/05/2022 24 1 - 32 U/L Final   2018 19 0 - 37 U/L Final               Assessment:          Diagnosis Plan   1. Primary hypertension        2. Mixed hyperlipidemia        3. Acquired hypothyroidism        4. Anxiety and depression        5. Abnormal EKG               Plan:       Ms. Akins presents for the followin. Abnormal EKG  - Appears consistent with left anterior fascicular block.  I suspect that the inferior infarct pattern is due to this, given that she has had reported negative stress testing in .  - We will acquire records from cardiologist in [place] with detailed stress testing report.  - Depending on stress test modality, may need echocardiogram to verify normal wall motion  - Per below, this does not preclude her from upcoming surgery    2. Perioperative  cardiac risk ratification for upcoming right TKA  - Patient is able to perform 4 METS of activity without limitations.  Has had recent normal stress testing in 2021.  Reports no active cardiac symptoms.  - Patient is low risk for upcoming surgery.  No further cardiac testing required.  - Continue rosuvastatin    3. Coronary artery disease  - Reportedly positive coronary calcium scan in [place].  Drugs were not available for review, but remains on rosuvastatin 10 with adequately controlled lipids on this dose.  LDL 62 recently  - Per patient report, was not severe, no need for aspirin at this time.    4.  Hypertension  - Well-controlled    Thank you very much for allowing us to participate in the care of this pleasant patient.  Please don't hesitate to call if I can be of assistance in any way.      RTC 1 year for further preventative risk factor assessment.      Current Outpatient Medications:   •  amLODIPine (NORVASC) 5 MG tablet, TAKE 1 TABLET BY MOUTH DAILY, Disp: 90 tablet, Rfl: 1  •  azelastine (ASTELIN) 0.1 % nasal spray, USE 2 SPRAYS INTO EACH NOSTRIL AS DIRECTED BY PROVIDER 2 TIMES A DAY, Disp: 3 each, Rfl: 1  •  busPIRone (BUSPAR) 15 MG tablet, Take 15 mg by mouth 2 (Two) Times a Day., Disp: , Rfl:   •  cetirizine-pseudoephedrine (ZyrTEC-D) 5-120 MG per 12 hr tablet, Take 1 tablet by mouth 2 (Two) Times a Day., Disp: 96 tablet, Rfl: 3  •  clindamycin (CLEOCIN) 300 MG capsule, Take 2 caps by mouth I hr before dental work, Disp: 30 capsule, Rfl: 0  •  Coenzyme Q10 (Co Q-10) 300 MG capsule, Take  by mouth., Disp: , Rfl:   •  CRANBERRY PO, Take 650 mg by mouth., Disp: , Rfl:   •  escitalopram (LEXAPRO) 20 MG tablet, Take 1 tablet by mouth Daily., Disp: 90 tablet, Rfl: 1  •  flunisolide (NASALIDE) 25 MCG/ACT (0.025%) solution nasal spray, Inhale 2 sprays Every 12 (Twelve) Hours., Disp: 1 bottle, Rfl: 11  •  Ginkgo Biloba 40 MG tablet, Take 1 tablet by mouth daily., Disp: , Rfl:   •  ipratropium (ATROVENT)  0.02 % nebulizer solution, Take 21 mcg by nebulization 4 (Four) Times a Day., Disp: , Rfl:   •  latanoprost (XALATAN) 0.005 % ophthalmic solution, Administer 1 drop to both eyes Every Night., Disp: , Rfl:   •  levothyroxine (SYNTHROID, LEVOTHROID) 25 MCG tablet, TAKE 1 TABLET BY MOUTH EVERY DAY, Disp: 90 tablet, Rfl: 1  •  montelukast (SINGULAIR) 10 MG tablet, Take 1 tablet by mouth Every Night. 1 po qd, Disp: 90 tablet, Rfl: 1  •  rosuvastatin (CRESTOR) 10 MG tablet, TAKE 1 TABLET BY MOUTH DAILY, Disp: 90 tablet, Rfl: 1  •  Turmeric (QC TUMERIC COMPLEX PO), Take 800 mg by mouth., Disp: , Rfl:   •  valACYclovir (VALTREX) 1000 MG tablet, Take one daily, Disp: 30 tablet, Rfl: 6  •  valsartan-hydrochlorothiazide (DIOVAN-HCT) 320-25 MG per tablet, Take 1 tablet by mouth Daily., Disp: 90 tablet, Rfl: 1         Return in about 1 year (around 10/11/2023).      **Rose Disclaimer:   Much of this encounter note is an electronic transcription/translation of spoken language to printed text. The electronic translation of spoken language may permit erroneous, or at times, nonsensical words or phrases to be inadvertently transcribed. Although I have reviewed the note for such errors, some may still exist.

## 2022-10-16 RX ORDER — ONDANSETRON 4 MG/1
4 TABLET, FILM COATED ORAL EVERY 8 HOURS PRN
Qty: 30 TABLET | Refills: 0 | Status: SHIPPED | OUTPATIENT
Start: 2022-10-16 | End: 2023-04-06

## 2022-10-26 DIAGNOSIS — G47.9 SLEEP DISTURBANCE: Primary | ICD-10-CM

## 2022-10-26 RX ORDER — TRAZODONE HYDROCHLORIDE 50 MG/1
50 TABLET ORAL NIGHTLY
Qty: 30 TABLET | Refills: 0 | Status: SHIPPED | OUTPATIENT
Start: 2022-10-26 | End: 2023-04-06

## 2023-01-15 DIAGNOSIS — I10 PRIMARY HYPERTENSION: ICD-10-CM

## 2023-01-16 RX ORDER — VALSARTAN AND HYDROCHLOROTHIAZIDE 320; 25 MG/1; MG/1
1 TABLET, FILM COATED ORAL DAILY
Qty: 10 TABLET | Refills: 0 | Status: SHIPPED | OUTPATIENT
Start: 2023-01-16 | End: 2023-02-14

## 2023-01-16 RX ORDER — ROSUVASTATIN CALCIUM 10 MG/1
10 TABLET, COATED ORAL DAILY
Qty: 10 TABLET | Refills: 0 | Status: SHIPPED | OUTPATIENT
Start: 2023-01-16 | End: 2023-02-14

## 2023-01-16 RX ORDER — AMLODIPINE BESYLATE 5 MG/1
5 TABLET ORAL DAILY
Qty: 10 TABLET | Refills: 0 | Status: SHIPPED | OUTPATIENT
Start: 2023-01-16 | End: 2023-02-14

## 2023-02-13 DIAGNOSIS — I10 PRIMARY HYPERTENSION: ICD-10-CM

## 2023-02-14 RX ORDER — ROSUVASTATIN CALCIUM 10 MG/1
10 TABLET, COATED ORAL DAILY
Qty: 90 TABLET | Refills: 1 | Status: SHIPPED | OUTPATIENT
Start: 2023-02-14

## 2023-02-14 RX ORDER — AMLODIPINE BESYLATE 5 MG/1
5 TABLET ORAL DAILY
Qty: 90 TABLET | Refills: 1 | Status: SHIPPED | OUTPATIENT
Start: 2023-02-14

## 2023-02-14 RX ORDER — VALSARTAN AND HYDROCHLOROTHIAZIDE 320; 25 MG/1; MG/1
1 TABLET, FILM COATED ORAL DAILY
Qty: 90 TABLET | Refills: 1 | Status: SHIPPED | OUTPATIENT
Start: 2023-02-14 | End: 2024-02-14

## 2023-02-16 RX ORDER — VALACYCLOVIR HYDROCHLORIDE 1 G/1
TABLET, FILM COATED ORAL
Qty: 30 TABLET | Refills: 6 | Status: SHIPPED | OUTPATIENT
Start: 2023-02-16

## 2023-04-06 ENCOUNTER — OFFICE VISIT (OUTPATIENT)
Dept: FAMILY MEDICINE CLINIC | Facility: CLINIC | Age: 63
End: 2023-04-06
Payer: COMMERCIAL

## 2023-04-06 VITALS
OXYGEN SATURATION: 100 % | BODY MASS INDEX: 36.1 KG/M2 | RESPIRATION RATE: 16 BRPM | HEART RATE: 84 BPM | WEIGHT: 230 LBS | HEIGHT: 67 IN | SYSTOLIC BLOOD PRESSURE: 124 MMHG | DIASTOLIC BLOOD PRESSURE: 74 MMHG

## 2023-04-06 DIAGNOSIS — Z00.00 ROUTINE ADULT HEALTH MAINTENANCE: Primary | ICD-10-CM

## 2023-04-06 DIAGNOSIS — I10 PRIMARY HYPERTENSION: ICD-10-CM

## 2023-04-06 DIAGNOSIS — J30.89 NON-SEASONAL ALLERGIC RHINITIS, UNSPECIFIED TRIGGER: ICD-10-CM

## 2023-04-06 DIAGNOSIS — E78.2 MIXED HYPERLIPIDEMIA: ICD-10-CM

## 2023-04-06 DIAGNOSIS — M81.0 OSTEOPOROSIS WITHOUT CURRENT PATHOLOGICAL FRACTURE, UNSPECIFIED OSTEOPOROSIS TYPE: ICD-10-CM

## 2023-04-06 DIAGNOSIS — E03.9 ACQUIRED HYPOTHYROIDISM: ICD-10-CM

## 2023-04-06 DIAGNOSIS — Z79.899 HIGH RISK MEDICATION USE: ICD-10-CM

## 2023-04-06 PROCEDURE — 99396 PREV VISIT EST AGE 40-64: CPT | Performed by: FAMILY MEDICINE

## 2023-04-06 PROCEDURE — 99214 OFFICE O/P EST MOD 30 MIN: CPT | Performed by: FAMILY MEDICINE

## 2023-04-06 RX ORDER — BUSPIRONE HYDROCHLORIDE 30 MG/1
TABLET ORAL
COMMUNITY
Start: 2023-02-28

## 2023-04-06 RX ORDER — VENLAFAXINE HYDROCHLORIDE 150 MG/1
150 CAPSULE, EXTENDED RELEASE ORAL DAILY
COMMUNITY

## 2023-04-06 NOTE — PROGRESS NOTES
Preventive Exam    History of Present Illness: Alice is here for check up and review of routine health maintenance. She states she is doing well and has no concerns.    Alice has chronic hypertension and has been well controlled on current medications.She  is monitored by me every 6 months and is here today for follow up. She is tolerating medications without side effect. She reports no vision changes, headaches or lightheadedness. She is requesting refills of medications.    Alice has chronic hypothyroidism and has been well controlled on current dose of replacement.She report no concerning symptoms: no cold intolerance, fatigue or hair loss.     She has chronic hyperlipidemia and his here today for regular 6 month monitoring of response to therapy. Lab work will be checked today. She is tolerating medications well without side effects.    She has chronic non seasonal allergies and is doing well with over-the-counter medication and azelastine nasal spray and Nasalide spray.  She is also using montelukast.    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    She has chronic anxiety and depression and is being seen by psychiatrist Elaina Penny.    Pap Smear:Hysterectomy  Mammogram:2022  Colon cancer screenin2016  STI screening:NI  Tobacco use :Former smoker  Bone Density:3/10/2022      REVIEW OF SYSTEMS  Constitutional: Negative.    HENT: Negative.    Eyes: Negative.    Respiratory: Negative.    Cardiovascular: Negative.    Gastrointestinal: Negative.    Endocrine: Negative.    Genitourinary: Negative.    Musculoskeletal: Negative.  Skin: Negative.    Allergic/Immunologic: Negative.    Neurological: Negative.    Hematological: Negative.    Psychiatric/Behavioral: Negative.    I have reviewed the ROS as documented by the MA. Paul Lau MD       PHYSICAL EXAM    Vitals:    23 1329   BP: 124/74  "  Pulse: 84   Resp: 16   SpO2: 100%   Weight: 104 kg (230 lb)   Height: 170.2 cm (67\")     GENERAL: alert and oriented, afebrile and vital signs stable  HEENT: oral mucosa moist, PEERLA, EOM, conjunctiva normal  No cervical adenopathy  LUNGS: clear to ascultation bilaterally, no rales, ronchi or wheezing  HEART: RRR S1 S2 without murmers, thrills, rubs or gallops  CHEST WALL: within normal limits, no tenderness  ABDOMEN: WNL. Normal BS.  EXTREMITIES: No clubbing, cyanosis or edema noted. Normal Pulses.  SKIN: warm, dry, no rashes noted  NEURO: CN II- XII grossly intact  PSYCH: Good mood and positive affect  ASSESSMENT AND PLAN  Problem List Items Addressed This Visit        Allergies and Adverse Reactions    Non-seasonal allergic rhinitis    Overview     She has chronic allergic rhinitis and is taking multiple medications to help manage her symptoms.  She is currently taking Alertec over-the-counter, flunisolide nasal spray, azelastine nasal spray, and montelukast to help control symptoms.  She is still struggling with the symptoms.            Cardiac and Vasculature    Primary hypertension    Overview     Well controlled on current medication, will refill medication today and as needed. She will RTO for repeat B/P check in 6 months.Amlodipine 5 mg and Valsartan HCTZ 320/25 mg a day.         Mixed hyperlipidemia    Overview     She has been well controlled on Crestor 10 mg daily and was advised to continue a healthy low fat diet, include regular exercise and take medications as prescribed.She  will RTO in 6 months for regularly scheduled follow up to evaluate response to therapy and make medication adjustments as needed.          Relevant Orders    Lipid Panel With LDL / HDL Ratio       Endocrine and Metabolic    Hypothyroidism    Overview     Will check lipid panel today and She was advised to continue a healthy low fat diet, include regular exercise and take medications as prescribed.Patient will RTO in 6 months " for regularly scheduled follow up to evaluate response to therapy and make medication adjustments as needed. Levothyroxine 25 mcg a day.        Other Visit Diagnoses     Routine adult health maintenance    -  Primary    High risk medication use        Relevant Orders    Comprehensive Metabolic Panel    CBC (No Diff)    Osteoporosis without current pathological fracture, unspecified osteoporosis type        Relevant Orders    Ambulatory Referral to Sports Medicine (Completed)        Routine health maintenance reviewed and discussed with Alice.  Class 2 Severe Obesity (BMI >=35 and <=39.9). Obesity-related health conditions include the following: hypertension. Obesity is improving with lifestyle modifications. BMI is is above average; no BMI management plan is appropriate. We discussed increasing exercise.     Orders Placed This Encounter   Procedures   • Lipid Panel With LDL / HDL Ratio   • Comprehensive Metabolic Panel   • CBC (No Diff)   • Ambulatory Referral to Sports Medicine      Return in about 6 months (around 10/6/2023) for Recheck.

## 2023-04-06 NOTE — PATIENT INSTRUCTIONS
Your Annual Physical  Personal Prevention Plan      Date of Office Visit:    Encounter Provider:  Paul Lau MD  Place of Service:  [facility]  Patient Name: Alice Akins  :  1960    As part of the Annual Physical portion of your visit today, we are providing you with this personalized preventive plan of services (PPPS). This plan is based upon recommendations of the [place] Preventive Services Task Force (USPSTF) and the Advisory Committee on Immunization Practices (ACIP).    This lists the preventive care services that should be considered, and provides dates of when you are due. Items listed as completed are up-to-date and do not require any further intervention.    Health Maintenance   Topic Date Due    LIPID PANEL  03/10/2023    INFLUENZA VACCINE  2023    ANNUAL PHYSICAL  2024    MAMMOGRAM  2024    DXA SCAN  2024    COLORECTAL CANCER SCREENING  2026    TDAP/TD VACCINES (4 - Td or Tdap) 2032    HEPATITIS C SCREENING  Completed    COVID-19 Vaccine  Completed    ZOSTER VACCINE  Completed    Pneumococcal Vaccine 0-64  Aged Out    PAP SMEAR  Discontinued       Orders Placed This Encounter   Procedures    Lipid Panel With LDL / HDL Ratio     Standing Status:   Future     Standing Expiration Date:   2024     Order Specific Question:   Release to patient     Answer:   Routine Release    Comprehensive Metabolic Panel     Standing Status:   Future     Standing Expiration Date:   2024     Order Specific Question:   Release to patient     Answer:   Routine Release    CBC (No Diff)     Standing Status:   Future     Standing Expiration Date:   2024     Order Specific Question:   Release to patient     Answer:   Routine Release    Ambulatory Referral to Sports Medicine     Referral Priority:   Routine     Referral Type:   Consultation     Referral Reason:   Specialty Services Required     Referred to Provider:   Hari  Brian Rosa MD     Requested Specialty:   Sports Medicine     Number of Visits Requested:   1       Return in about 6 months (around 10/6/2023) for Recheck.

## 2023-05-07 DIAGNOSIS — I10 PRIMARY HYPERTENSION: ICD-10-CM

## 2023-05-08 RX ORDER — BUSPIRONE HYDROCHLORIDE 30 MG/1
30 TABLET ORAL DAILY
Qty: 90 TABLET | Refills: 1 | Status: SHIPPED | OUTPATIENT
Start: 2023-05-08

## 2023-05-08 RX ORDER — VALSARTAN AND HYDROCHLOROTHIAZIDE 320; 25 MG/1; MG/1
1 TABLET, FILM COATED ORAL DAILY
Qty: 90 TABLET | Refills: 1 | Status: SHIPPED | OUTPATIENT
Start: 2023-05-08 | End: 2024-05-07

## 2023-05-08 RX ORDER — VENLAFAXINE HYDROCHLORIDE 150 MG/1
150 CAPSULE, EXTENDED RELEASE ORAL DAILY
Qty: 90 CAPSULE | Refills: 1 | Status: SHIPPED | OUTPATIENT
Start: 2023-05-08

## 2023-05-08 RX ORDER — AMLODIPINE BESYLATE 5 MG/1
5 TABLET ORAL DAILY
Qty: 90 TABLET | Refills: 1 | Status: SHIPPED | OUTPATIENT
Start: 2023-05-08

## 2023-05-08 RX ORDER — LEVOTHYROXINE SODIUM 0.03 MG/1
25 TABLET ORAL DAILY
Qty: 90 TABLET | Refills: 1 | Status: SHIPPED | OUTPATIENT
Start: 2023-05-08

## 2023-05-08 RX ORDER — ROSUVASTATIN CALCIUM 10 MG/1
10 TABLET, COATED ORAL DAILY
Qty: 90 TABLET | Refills: 1 | Status: SHIPPED | OUTPATIENT
Start: 2023-05-08

## 2023-07-27 ENCOUNTER — TRANSCRIBE ORDERS (OUTPATIENT)
Dept: ADMINISTRATIVE | Facility: HOSPITAL | Age: 63
End: 2023-07-27
Payer: COMMERCIAL

## 2023-07-27 DIAGNOSIS — Z12.31 SCREENING MAMMOGRAM, ENCOUNTER FOR: Primary | ICD-10-CM

## 2023-09-01 ENCOUNTER — OFFICE VISIT (OUTPATIENT)
Dept: FAMILY MEDICINE CLINIC | Facility: CLINIC | Age: 63
End: 2023-09-01
Payer: COMMERCIAL

## 2023-09-01 VITALS
RESPIRATION RATE: 16 BRPM | WEIGHT: 230 LBS | HEART RATE: 77 BPM | HEIGHT: 67 IN | BODY MASS INDEX: 36.1 KG/M2 | SYSTOLIC BLOOD PRESSURE: 122 MMHG | DIASTOLIC BLOOD PRESSURE: 80 MMHG | OXYGEN SATURATION: 99 %

## 2023-09-01 DIAGNOSIS — R20.2 NUMBNESS AND TINGLING: ICD-10-CM

## 2023-09-01 DIAGNOSIS — R20.0 NUMBNESS AND TINGLING: ICD-10-CM

## 2023-09-01 DIAGNOSIS — R26.89 BALANCE DISORDER: Primary | ICD-10-CM

## 2023-09-01 PROCEDURE — 99214 OFFICE O/P EST MOD 30 MIN: CPT | Performed by: FAMILY MEDICINE

## 2023-09-01 NOTE — PROGRESS NOTES
"Subjective   Alice Akins is a 63 y.o. female.   Balance Issues and Numbness (Hands and feet)    History of Present Illness  Alice Adams is here to discuss balance issues as well  as numbness in hands and feet.  She states she has been falling quite often.  Left leg wound infection followed by [facility] [place].  No loss of conciseness.  Hx of falls have gotten worse in the last 3 months. She reports  nodiziness    Her feet are in good chape.  Numbness not relating to fall but notes numbness is getting more noticeable  in arms  and legrs and feet , getting worse over the 3 months   Falling started in [place] with face plants and 3 since  January,   Review of Systems   Skin:  Positive for wound.   Neurological:  Positive for light-headedness.        Unexplained falls   Psychiatric/Behavioral: Negative.     All other systems reviewed and are negative.    Objective   Vitals:    09/01/23 1306   BP: 122/80   Pulse: 77   Resp: 16   SpO2: 99%   Weight: 104 kg (230 lb)   Height: 170.2 cm (67\")      Body mass index is 36.02 kg/m².        Physical Exam  Vitals and nursing note reviewed.   Constitutional:       Appearance: She is well-developed.   HENT:      Head: Normocephalic and atraumatic.   Eyes:      Pupils: Pupils are equal, round, and reactive to light.   Pulmonary:      Effort: Pulmonary effort is normal.   Musculoskeletal:      Comments: Healing wound on left leg   Neurological:      Mental Status: She is alert and oriented to person, place, and time.   Psychiatric:         Mood and Affect: Mood normal.         Behavior: Behavior normal.         Thought Content: Thought content normal.         Judgment: Judgment normal.         Assessment & Plan   Problem List Items Addressed This Visit    None  Visit Diagnoses       Balance disorder    -  Primary    Relevant Orders    Ambulatory Referral to Neurology    Numbness and tingling        Relevant Orders    Ambulatory Referral to Neurology               Orders Placed " This Encounter   Procedures    Ambulatory Referral to Neurology        No follow-ups on file. Answers submitted by the patient for this visit:  Other (Submitted on 8/28/2023)  Please describe your symptoms.: I’ve fallen in the past 6 months probably once ir twice a month.  Have you had these symptoms before?: Yes  How long have you been having these symptoms?: Greater than 2 weeks  Please list any medications you are currently taking for this condition.: None  Primary Reason for Visit (Submitted on 8/28/2023)  What is the primary reason for your visit?: Other

## 2023-09-08 ENCOUNTER — HOSPITAL ENCOUNTER (OUTPATIENT)
Dept: MAMMOGRAPHY | Facility: HOSPITAL | Age: 63
Discharge: HOME | End: 2023-09-08
Admitting: FAMILY MEDICINE
Payer: COMMERCIAL

## 2023-09-08 DIAGNOSIS — Z12.31 SCREENING MAMMOGRAM, ENCOUNTER FOR: ICD-10-CM

## 2023-09-08 PROCEDURE — 77067 SCR MAMMO BI INCL CAD: CPT

## 2023-09-08 PROCEDURE — 77063 BREAST TOMOSYNTHESIS BI: CPT

## 2023-09-25 ENCOUNTER — TELEPHONE (OUTPATIENT)
Dept: NEUROLOGY | Facility: CLINIC | Age: 63
End: 2023-09-25

## 2023-10-05 ENCOUNTER — OFFICE VISIT (OUTPATIENT)
Dept: FAMILY MEDICINE CLINIC | Facility: CLINIC | Age: 63
End: 2023-10-05
Payer: COMMERCIAL

## 2023-10-05 VITALS
SYSTOLIC BLOOD PRESSURE: 140 MMHG | HEIGHT: 67 IN | RESPIRATION RATE: 16 BRPM | BODY MASS INDEX: 36.26 KG/M2 | OXYGEN SATURATION: 99 % | WEIGHT: 231 LBS | HEART RATE: 60 BPM | DIASTOLIC BLOOD PRESSURE: 80 MMHG

## 2023-10-05 DIAGNOSIS — Z23 NEED FOR VACCINATION: Primary | ICD-10-CM

## 2023-10-05 DIAGNOSIS — R26.89 BALANCE DISORDER: ICD-10-CM

## 2023-10-05 DIAGNOSIS — E03.9 ACQUIRED HYPOTHYROIDISM: ICD-10-CM

## 2023-10-05 DIAGNOSIS — E78.2 MIXED HYPERLIPIDEMIA: ICD-10-CM

## 2023-10-05 DIAGNOSIS — I10 PRIMARY HYPERTENSION: ICD-10-CM

## 2023-10-05 NOTE — PROGRESS NOTES
"Subjective   Alice Akins is a 63 y.o. female.   Hypertension    History of Present Illness  Alice is here today for follow-up on management of multiple chronic conditions.  She is doing well.  She is taking her medications and reports no side effects.  Alice has chronic hypertension and has been well controlled on current medications.She  is monitored by me every 6 months and is here today for follow up. She is tolerating medications without side effect. She reports no vision changes, headaches or lightheadedness. She is requesting refills of medications.   She has chronic hyperlipidemia and his here today for regular 6 month monitoring of response to therapy. Lab work will be checked today. She is tolerating medications well without side effects.   Alice has chronic hypothyroidism and has been well controlled on current dose of replacement.She report no concerning symptoms: no cold intolerance, fatigue or hair loss.      She is seeing wound care after a fall and injury to her Lt  leg.  She is seeing Neurology this week.  She states she did not get referral to PT but thinks she should go due to her balance issues.  She is asking me to place this referral.    Review of Systems   Constitutional: Negative.    HENT: Negative.     Eyes: Negative.    Respiratory: Negative.     Cardiovascular: Negative.    Genitourinary: Negative.    Skin:  Positive for wound.   Neurological: Negative.         Unexplained falls   Psychiatric/Behavioral: Negative.     All other systems reviewed and are negative.    Objective   Vitals:    10/05/23 0935   BP: 140/80   Pulse: 60   Resp: 16   SpO2: 99%   Weight: 105 kg (231 lb)   Height: 170.2 cm (67\")      Body mass index is 36.18 kg/m².        Physical Exam  Vitals and nursing note reviewed.   Constitutional:       Appearance: She is well-developed.   HENT:      Head: Normocephalic and atraumatic.   Eyes:      Pupils: Pupils are equal, round, and reactive to light.   Pulmonary:      Effort: " Pulmonary effort is normal.   Musculoskeletal:         General: Normal range of motion.      Comments: Healing wound on left leg   Neurological:      Mental Status: She is alert and oriented to person, place, and time.   Psychiatric:         Mood and Affect: Mood normal.         Behavior: Behavior normal.         Thought Content: Thought content normal.         Judgment: Judgment normal.         Assessment & Plan   Problem List Items Addressed This Visit          Cardiac and Vasculature    Primary hypertension    Overview     Well controlled on current medication, will refill medication today and as needed. She will RTO for repeat B/P check in 6 months.Amlodipine 5 mg and Valsartan HCTZ 320/25 mg a day.         Mixed hyperlipidemia    Overview     She has been well controlled on Crestor 10 mg daily and was advised to continue a healthy low fat diet, include regular exercise and take medications as prescribed.She  will RTO in 6 months for regularly scheduled follow up to evaluate response to therapy and make medication adjustments as needed.          Relevant Orders    Comprehensive Metabolic Panel    Lipid Panel With / Chol / HDL Ratio       Endocrine and Metabolic    Hypothyroidism    Overview     Will check lipid panel today and She was advised to continue a healthy low fat diet, include regular exercise and take medications as prescribed.Patient will RTO in 6 months for regularly scheduled follow up to evaluate response to therapy and make medication adjustments as needed. Levothyroxine 25 mcg a day.         Relevant Orders    TSH     Other Visit Diagnoses       Need for vaccination    -  Primary    Relevant Orders    Fluzone >6 Months (3205-5007) (Completed)    Balance disorder        Relevant Orders    Ambulatory Referral to Physical Therapy (Completed)               Orders Placed This Encounter   Procedures    Fluzone >6 Months (7120-7315)    TSH    Comprehensive Metabolic Panel    Lipid Panel With / Chol /  HDL Ratio    Ambulatory Referral to Physical Therapy        Return in about 6 months (around 4/5/2024) for Annual physical. Answers submitted by the patient for this visit:  Other (Submitted on 9/28/2023)  Please describe your symptoms.: Follow up to annual  Have you had these symptoms before?: Yes  How long have you been having these symptoms?: Greater than 2 weeks  Primary Reason for Visit (Submitted on 9/28/2023)  What is the primary reason for your visit?: Other

## 2023-10-06 ENCOUNTER — LAB (OUTPATIENT)
Dept: LAB | Facility: HOSPITAL | Age: 63
End: 2023-10-06
Payer: COMMERCIAL

## 2023-10-06 ENCOUNTER — OFFICE VISIT (OUTPATIENT)
Dept: NEUROLOGY | Facility: CLINIC | Age: 63
End: 2023-10-06
Payer: COMMERCIAL

## 2023-10-06 VITALS
SYSTOLIC BLOOD PRESSURE: 118 MMHG | DIASTOLIC BLOOD PRESSURE: 78 MMHG | WEIGHT: 233 LBS | HEART RATE: 85 BPM | OXYGEN SATURATION: 96 % | HEIGHT: 67 IN | BODY MASS INDEX: 36.57 KG/M2

## 2023-10-06 DIAGNOSIS — R20.0 NUMBNESS AND TINGLING: Primary | ICD-10-CM

## 2023-10-06 DIAGNOSIS — R41.3 MEMORY LOSS: ICD-10-CM

## 2023-10-06 DIAGNOSIS — E53.8 B12 DEFICIENCY: ICD-10-CM

## 2023-10-06 DIAGNOSIS — R20.2 NUMBNESS AND TINGLING: Primary | ICD-10-CM

## 2023-10-06 DIAGNOSIS — R29.6 MULTIPLE FALLS: ICD-10-CM

## 2023-10-06 LAB
ALBUMIN SERPL-MCNC: 4.4 G/DL (ref 3.5–5.2)
ALBUMIN/GLOB SERPL: 2.3 G/DL
ALP SERPL-CCNC: 67 U/L (ref 39–117)
ALT SERPL-CCNC: 15 U/L (ref 1–33)
AST SERPL-CCNC: 18 U/L (ref 1–32)
BILIRUB SERPL-MCNC: 0.5 MG/DL (ref 0–1.2)
BUN SERPL-MCNC: 9 MG/DL (ref 8–23)
BUN/CREAT SERPL: 14.8 (ref 7–25)
CALCIUM SERPL-MCNC: 9.8 MG/DL (ref 8.6–10.5)
CHLORIDE SERPL-SCNC: 98 MMOL/L (ref 98–107)
CHOLEST SERPL-MCNC: 147 MG/DL (ref 0–200)
CHOLEST/HDLC SERPL: 2.3 {RATIO}
CO2 SERPL-SCNC: 29.7 MMOL/L (ref 22–29)
CREAT SERPL-MCNC: 0.61 MG/DL (ref 0.57–1)
EGFRCR SERPLBLD CKD-EPI 2021: 100.6 ML/MIN/1.73
GLOBULIN SER CALC-MCNC: 1.9 GM/DL
GLUCOSE SERPL-MCNC: 114 MG/DL (ref 65–99)
HDLC SERPL-MCNC: 64 MG/DL (ref 40–60)
LDLC SERPL CALC-MCNC: 66 MG/DL (ref 0–100)
POTASSIUM SERPL-SCNC: 3.9 MMOL/L (ref 3.5–5.2)
PROT SERPL-MCNC: 6.3 G/DL (ref 6–8.5)
SODIUM SERPL-SCNC: 136 MMOL/L (ref 136–145)
TRIGL SERPL-MCNC: 93 MG/DL (ref 0–150)
TSH SERPL DL<=0.005 MIU/L-ACNC: 0.88 UIU/ML (ref 0.27–4.2)
VIT B12 BLD-MCNC: 265 PG/ML (ref 211–946)
VLDLC SERPL CALC-MCNC: 17 MG/DL (ref 5–40)

## 2023-10-06 PROCEDURE — 82607 VITAMIN B-12: CPT | Performed by: NURSE PRACTITIONER

## 2023-10-06 PROCEDURE — 36415 COLL VENOUS BLD VENIPUNCTURE: CPT | Performed by: NURSE PRACTITIONER

## 2023-10-06 NOTE — PROGRESS NOTES
[facility] NEUROLOGY         Date of Visit: 10/6/2023    Name: Alice Akins    :  1960    PCP: Paul Lau MD    Visit Type: an initial evaluation         Subjective     Patient ID: Alice is a 63 y.o. female.         History of Present Illness  I had the pleasure of seeing your patient for the first time today.  As you may know she is a 63-year-old female here today for initial evaluation for complaints of multiple falls, balance difficulty, and numbness and tingling of the lower extremities and hands.  She was referred by her primary care physician.    History:    Patient does have medical history of hypertension and hypothyroid.    Patient states that she has been experiencing symptoms of balance difficulty and falls going on approximately 2 to 3 years now.  She states however the symptoms have become more frequent and she has had 2 falls in the last year resulting in injuries.  1 fall resulted in a hematoma to her scalp.  A CT of the head was performed at that time which was normal.  Her second fall resulted in a wound to her left shin resulting in wound management consultation.  She is still working on healing from this.  This was approximately 6 weeks ago.    Patient states that she has always had something that has caused the fall for example her foot will get caught on something or she seems to get caught up on something with her feet.  She states that she is trying to be more aware of her walking and has not had any recent falls since the most recent injury.  However she states that she is not sure what could be causing her to have more difficulty as this was not a problem that had been common for her in the past.  She does acknowledge some tingling or decreased sensation in her legs.  She states the left leg is worse especially on the medial part of her left shin where she had her injury.  She states she also has some tingling and numbness in the hands that will happen on  occasion however has become less frequent more recently.  She does have a previous history of being treated for B12 deficiency however has not been on any B12 replacement in the last 6 months or so.  She is not diabetic.  She has never received chemotherapy or radiation treatments.  She has never had EMG study done.  She does report some low back pain symptoms but no significant neck pain.  She denies bowel or bladder issues.  She does have an order for physical therapy to work with balance and gait training which she will be starting soon.  She has not had any previous imaging of the brain apart from the CT scan.  No other new neurological complaints at today's visit.    The following portions of the patient's history were reviewed and updated as appropriate: allergies, current medications, past family history, past medical history, past social history, past surgical history, and problem list.                 Review of Systems   Constitutional:  Negative for activity change, appetite change, fatigue and unexpected weight change.   HENT:  Negative for tinnitus and trouble swallowing.    Eyes:  Negative for visual disturbance.   Respiratory:  Negative for chest tightness and shortness of breath.    Gastrointestinal:  Negative for constipation, diarrhea, nausea and vomiting.   Genitourinary:  Negative for difficulty urinating, frequency and urgency.   Musculoskeletal:  Positive for back pain and gait problem. Negative for neck pain.   Neurological:  Positive for numbness. Negative for dizziness, tremors, seizures, syncope, facial asymmetry, speech difficulty, weakness, light-headedness and headaches.   Psychiatric/Behavioral:  Negative for confusion and sleep disturbance.           Current Medications:    Current Outpatient Medications   Medication Instructions    amLODIPine (NORVASC) 5 mg, Oral, Daily    busPIRone (BUSPAR) 30 mg, Oral, Daily    cetirizine-pseudoephedrine (ZyrTEC-D) 5-120 MG per 12 hr tablet 1 tablet,  "Oral, 2 Times Daily    Coenzyme Q10 (Co Q-10) 300 MG capsule Oral    CRANBERRY  mg, Oral    Ginkgo Biloba 40 MG tablet 1 tablet, Oral, Daily    latanoprost (XALATAN) 0.005 % ophthalmic solution 1 drop, Both Eyes, Nightly    levothyroxine (SYNTHROID, LEVOTHROID) 25 mcg, Oral, Daily    rosuvastatin (CRESTOR) 10 mg, Oral, Daily    Turmeric (QC TUMERIC COMPLEX PO) 800 mg, Oral    valsartan-hydrochlorothiazide (DIOVAN-HCT) 320-25 MG per tablet 1 tablet, Oral, Daily    venlafaxine XR (EFFEXOR-XR) 150 mg, Oral, Daily          /78   Pulse 85   Ht 170.2 cm (67\")   Wt 106 kg (233 lb)   SpO2 96%   BMI 36.49 kg/m²                Objective     Neurological Exam  Mental Status  Awake, alert and oriented to person, place and time. Speech is normal. Language is fluent with no aphasia.    Cranial Nerves  CN II: Visual fields full to confrontation.  CN III, IV, VI: Extraocular movements intact bilaterally. Normal lids and orbits bilaterally. Pupils equal round and reactive to light bilaterally.  CN V: Facial sensation is normal.  CN VII: Full and symmetric facial movement.  CN IX, X: Palate elevates symmetrically  CN XI: Shoulder shrug strength is normal.  CN XII: Tongue midline without atrophy or fasciculations.    Motor  Normal muscle bulk throughout. Normal muscle tone. No abnormal involuntary movements. Strength is 5/5 throughout all four extremities.    Sensory  Light touch abnormality: Decreased medial part of left shin normal elsewhere. Pinprick is normal in upper and lower extremities. Decreased cold to medial part of left shin normal elsewhere. Vibration abnormality: Decreased medial part of left shin normal elsewhere. Proprioception is normal in upper and lower extremities.     Reflexes  Deep tendon reflexes are 2+ and symmetric in all four extremities.    Coordination    Finger-to-nose, rapid alternating movements and heel-to-shin normal bilaterally without dysmetria.    Gait  Casual gait is normal " including stance, stride, and arm swing. Toe walking abnormality: Heel walking abnormality: Tandem gait abnormality: Romberg is absent. Able to rise from chair without using arms.    Physical Exam  Constitutional:       Appearance: Normal appearance. She is normal weight.   Eyes:      General: Lids are normal.      Extraocular Movements: Extraocular movements intact.      Pupils: Pupils are equal, round, and reactive to light.   Pulmonary:      Effort: Pulmonary effort is normal.   Skin:     General: Skin is warm.          Neurological:      Motor: Motor strength is normal.      Coordination: Coordination is intact. Romberg sign negative.      Deep Tendon Reflexes: Reflexes are normal and symmetric.   Psychiatric:         Mood and Affect: Mood normal.         Speech: Speech normal.         Behavior: Behavior normal.                   Assessment & Plan     Diagnoses and all orders for this visit:    1. Numbness and tingling (Primary)  -     Vitamin B12  -     Cancel: EMG & Nerve Conduction Test; Future  -     MRI Brain With & Without Contrast; Future  -     EMG & Nerve Conduction Test; Future    2. Multiple falls  -     MRI Brain With & Without Contrast; Future    3. Memory loss  -     Vitamin B12  -     MRI Brain With & Without Contrast; Future       At this time I did notice some balance and gait difficulty.  I would like to obtain MRI of the brain to make sure we did not miss any small lesions that could be contributing to balance issues for the patient.    In addition I would like to obtain EMG study to look for causes of a mild neuropathy such as peripheral neuropathy that could be contributing to symptoms.  I do think likely she has some possible nerve damage from her wound from her most recent fall.    In addition I would like to check a B12 level today as patient was also complaining of mild memory loss in addition to the tingling and falls.    Follow-up after testing is complete.            Suzi Dodson  PILI    Neurology    [facility] [place]    Phone: [phone]    10/6/2023 , 14:00 EDT Low B12 level. Recommend once weekly B12 injections x 4 weeks followed by biweekly injections x 4 months with repeat lab draw 1 month from last injection. Can you call patient?

## 2023-10-09 RX ORDER — NEEDLES, SAFETY 18GX1 1/2"
NEEDLE, DISPOSABLE MISCELLANEOUS
Qty: 12 EACH | Refills: 0 | Status: SHIPPED | OUTPATIENT
Start: 2023-10-09

## 2023-10-09 RX ORDER — CYANOCOBALAMIN 1000 UG/ML
INJECTION, SOLUTION INTRAMUSCULAR; SUBCUTANEOUS
Qty: 12 ML | Refills: 0 | Status: SHIPPED | OUTPATIENT
Start: 2023-10-09

## 2023-10-13 ENCOUNTER — PATIENT ROUNDING (BHMG ONLY) (OUTPATIENT)
Dept: NEUROLOGY | Facility: CLINIC | Age: 63
End: 2023-10-13
Payer: COMMERCIAL

## 2023-10-19 ENCOUNTER — OFFICE VISIT (OUTPATIENT)
Dept: CARDIOLOGY | Facility: CLINIC | Age: 63
End: 2023-10-19
Payer: COMMERCIAL

## 2023-10-19 VITALS
SYSTOLIC BLOOD PRESSURE: 116 MMHG | HEART RATE: 83 BPM | HEIGHT: 67 IN | OXYGEN SATURATION: 99 % | DIASTOLIC BLOOD PRESSURE: 76 MMHG | BODY MASS INDEX: 36.93 KG/M2 | WEIGHT: 235.3 LBS

## 2023-10-19 DIAGNOSIS — I10 PRIMARY HYPERTENSION: Primary | ICD-10-CM

## 2023-10-19 DIAGNOSIS — I44.4 LAFB (LEFT ANTERIOR FASCICULAR BLOCK): ICD-10-CM

## 2023-10-19 DIAGNOSIS — E78.2 MIXED HYPERLIPIDEMIA: ICD-10-CM

## 2023-10-19 PROCEDURE — 93000 ELECTROCARDIOGRAM COMPLETE: CPT | Performed by: STUDENT IN AN ORGANIZED HEALTH CARE EDUCATION/TRAINING PROGRAM

## 2023-10-19 PROCEDURE — 99214 OFFICE O/P EST MOD 30 MIN: CPT | Performed by: STUDENT IN AN ORGANIZED HEALTH CARE EDUCATION/TRAINING PROGRAM

## 2023-10-19 RX ORDER — MILK THISTLE 500 MG
CAPSULE ORAL
COMMUNITY
Start: 2023-10-01

## 2023-10-19 RX ORDER — VALACYCLOVIR HYDROCHLORIDE 1 G/1
1000 TABLET, FILM COATED ORAL 2 TIMES DAILY
COMMUNITY

## 2023-10-19 NOTE — PROGRESS NOTES
"      [facility]    Subjective:     Encounter Date:10/19/23      Patient ID: Alice Akins is a 63 y.o. female.    Chief Complaint:   Chief Complaint   Patient presents with    Hypertension      History of Present Illness    Ms. Akins is a pleasant 63-year-old lady past medical history hypertension, hyperlipidemia, prior tobacco abuse who presents to establish care after preoperative cardiac testing revealed an abnormal EKG.        Currently, she denies cardiac complaints.  She does report some exertional shortness of breath which has slightly persisted since her COVID infection in January, however she attributes that to some weight gain and decreased functional capacity due to right knee pain.  Currently, she is able to escalate a flight of stairs without stopping.  She is able to do her household chores without stopping due to shortness of breath, and otherwise reports a good functional tolerance.     She reports she has seen cardiologist previously due to abnormal EKGs.  It was first noted in 2004 before hysterectomy, and she had normal stress test at that time.  She has since then had numerous normal stress test due to EKGs to keep revealing \"inferior infarct pattern.\"  On further review, findings could be consistent with left anterior fascicular block.     She briefly moved to [place], and will establish with a cardiologist there.  Her last evaluation was 2 year ago, at which time she was noted to have a coronary calcium score that is mildly elevated, although report directly not available for review at this time.  She also had a normal stress test in 2021 as well, although the direct report of the stress test is not available for review, it was reported normal in the clinic note.     She states she quit smoking 20 years ago.  She otherwise denies active cardiac complaints.  And reports compliance with her rosuvastatin 10.  She has not taken daily aspirin.    Since her last visit, she " "underwent a TKA and tolerated well.  No cardiac complications.  She has no shortness of breath or lightheadedness at this time.  She does have some gait instability and is being worked up by neurology for this.  She denies any syncope or presyncope and states that she just loses her balance but has no other constitutional symptoms when she trips.       The following portions of the patient's history were reviewed and updated as appropriate: allergies, current medications, past family history, past medical history, past social history, past surgical history and problem list.    Past Medical History:   Diagnosis Date    Allergic Ongoing    Seasonal    Allergic rhinitis     Arthritis 2004    Cataract 03/03/2023    Eye  keeps check    Colon polyp 2013    COVID     Depression     GERD (gastroesophageal reflux disease)     Glaucoma     Hypertension     Hypothyroidism     Infectious viral hepatitis 1990s    Hepatitis B    Joint pain     Mixed hyperlipidemia 01/20/2022    Obesity     Osteoporosis     Thumb pain     Wrist pain        Past Surgical History:   Procedure Laterality Date    COLONOSCOPY      COSMETIC SURGERY  1990s    Remove birthmark    FOOT SURGERY Bilateral     HYSTERECTOMY      JOINT REPLACEMENT      Lt TKR    KNEE ARTHROSCOPY Left     OOPHORECTOMY             ECG 12 Lead    Date/Time: 10/19/2023 10:11 AM  Performed by: Paolo Mendez MD    Authorized by: Paolo Mendez MD  Comparison: compared with previous ECG from 10/11/2022  Similar to previous ECG  Rhythm: sinus rhythm  Rate: normal  Conduction: left anterior fascicular block  ST Segments: ST segments normal  T Waves: T waves normal  QRS axis: left  Other findings: poor R wave progression    Clinical impression: abnormal EKG             Objective:     Vitals:    10/19/23 0939   BP: 116/76   BP Location: Right arm   Patient Position: Sitting   Cuff Size: Large Adult   Pulse: 83   SpO2: 99%   Weight: 107 kg (235 lb 4.8 oz)   Height: 170.2 cm (67\")        "  Constitutional:       Appearance: Healthy appearance. Not in distress.   Neck:      Vascular: JVD normal.   Pulmonary:      Effort: Pulmonary effort is normal.      Breath sounds: Normal breath sounds.   Cardiovascular:      PMI at left midclavicular line. Normal rate. Regular rhythm. Normal S2.       Murmurs: There is no murmur.   Pulses:     Intact distal pulses.   Edema:     Peripheral edema absent.   Skin:     General: Skin is warm and dry.   Neurological:      General: No focal deficit present.      Mental Status: Alert, oriented to person, place, and time and oriented to person, place and time.   Psychiatric:         Mood and Affect: Mood and affect normal.         Lab Review:     Lipid Panel          4/11/2023    08:14 10/5/2023    10:14   Lipid Panel   Total Cholesterol 153  147    Triglycerides 83  93    HDL Cholesterol 62  64    VLDL Cholesterol 16  17    LDL Cholesterol  75  66    LDL/HDL Ratio 1.20       BUN   Date Value Ref Range Status   10/05/2023 9 8 - 23 mg/dL Final   10/05/2022 9 8 - 23 mg/dL Final   01/06/2018 16 6 - 25 mg/dL Final     Creatinine   Date Value Ref Range Status   10/05/2023 0.61 0.57 - 1.00 mg/dL Final   10/05/2022 0.65 0.57 - 1.00 mg/dL Final   01/06/2018 0.67 0.40 - 1.00 mg/dL Final     Comment:     Note new normal range.  IDMS-traceable method     Potassium   Date Value Ref Range Status   10/05/2023 3.9 3.5 - 5.2 mmol/L Final   10/05/2022 3.8 3.5 - 5.2 mmol/L Final   01/06/2018 3.9 3.5 - 5.1 mmol/L Final     ALT (SGPT)   Date Value Ref Range Status   10/05/2023 15 1 - 33 U/L Final   10/05/2022 18 1 - 33 U/L Final   01/06/2018 26 0 - 60 U/L Final     Comment:     Note new normal range.     AST (SGOT)   Date Value Ref Range Status   10/05/2023 18 1 - 32 U/L Final   10/05/2022 24 1 - 32 U/L Final   01/06/2018 19 0 - 37 U/L Final         Performed        Assessment:          Diagnosis Plan   1. Primary hypertension        2. Mixed hyperlipidemia        3. LAFB (left anterior  fascicular block)               Plan:         Abnormal EKG  - Appears consistent with left anterior fascicular block.  I suspect that the inferior infarct pattern is due to this, given that she has had reported negative stress testing in 2021.    -Continue with preventative therapy, continue statin, LDL at goal.     Perioperative cardiac risk ratification for upcoming right TKA  She underwent surgery and tolerated well without complications.      Coronary artery disease  Reportedly positive coronary calcium scan in [place].  We will obtain records from her cardiologist in [place].  Regardless, she is without cardiac symptoms, her cholesterol is at goal with LDL of 60's on Crestor 10.     Per patient report, was not severe, no need for aspirin at this time.     4.  Hypertension  - Well-controlled     RTC 1 year for risk factor check-in.    Paolo Mendez MD  [facility]  10/19/23  09:19 EDT       Current Outpatient Medications:     amLODIPine (NORVASC) 5 MG tablet, Take 1 tablet by mouth Daily., Disp: 90 tablet, Rfl: 1    busPIRone (BUSPAR) 30 MG tablet, Take 1 tablet by mouth Daily., Disp: 90 tablet, Rfl: 1    cetirizine-pseudoephedrine (ZyrTEC-D) 5-120 MG per 12 hr tablet, Take 1 tablet by mouth 2 (Two) Times a Day., Disp: 96 tablet, Rfl: 3    Coenzyme Q10 (Co Q-10) 300 MG capsule, Take  by mouth., Disp: , Rfl:     CRANBERRY PO, Take 650 mg by mouth., Disp: , Rfl:     cyanocobalamin 1000 MCG/ML injection, Inject 1 ML IM Q w X4 weeks, then 1 ml IM qow x 4 months. Total of 12 injections., Disp: 12 mL, Rfl: 0    Ginkgo Biloba 40 MG tablet, Take 1 tablet by mouth daily., Disp: , Rfl:     latanoprost (XALATAN) 0.005 % ophthalmic solution, Administer 1 drop to both eyes Every Night., Disp: , Rfl:     levothyroxine (SYNTHROID, LEVOTHROID) 25 MCG tablet, Take 1 tablet by mouth Daily., Disp: 90 tablet, Rfl: 1    Milk Thistle 500 MG capsule, , Disp: , Rfl:     rosuvastatin (CRESTOR) 10 MG tablet, Take 1  "tablet by mouth Daily., Disp: 90 tablet, Rfl: 1    Syringe/Needle, Disp, (BD Eclipse Syringe) 25G X 1\" 3 ML misc, Use with B12 Solution As Directed, Disp: 12 each, Rfl: 0    Turmeric (QC TUMERIC COMPLEX PO), Take 800 mg by mouth., Disp: , Rfl:     valACYclovir (VALTREX) 1000 MG tablet, Take 1 tablet by mouth 2 (Two) Times a Day., Disp: , Rfl:     valsartan-hydrochlorothiazide (DIOVAN-HCT) 320-25 MG per tablet, Take 1 tablet by mouth Daily., Disp: 90 tablet, Rfl: 1    venlafaxine XR (EFFEXOR-XR) 150 MG 24 hr capsule, Take 1 capsule by mouth Daily., Disp: 90 capsule, Rfl: 1         Return in about 1 year (around 10/19/2024).      Part of this note may be an electronic transcription/translation of spoken language to printed text using the Dragon Dictation System.  "

## 2023-10-30 DIAGNOSIS — I10 PRIMARY HYPERTENSION: ICD-10-CM

## 2023-10-30 RX ORDER — VALSARTAN AND HYDROCHLOROTHIAZIDE 320; 25 MG/1; MG/1
1 TABLET, FILM COATED ORAL DAILY
Qty: 90 TABLET | Refills: 0 | Status: SHIPPED | OUTPATIENT
Start: 2023-10-30 | End: 2024-10-29

## 2023-10-30 RX ORDER — BUSPIRONE HYDROCHLORIDE 30 MG/1
30 TABLET ORAL DAILY
Qty: 90 TABLET | Refills: 0 | Status: SHIPPED | OUTPATIENT
Start: 2023-10-30

## 2023-10-30 RX ORDER — LEVOTHYROXINE SODIUM 0.03 MG/1
25 TABLET ORAL DAILY
Qty: 90 TABLET | Refills: 0 | Status: SHIPPED | OUTPATIENT
Start: 2023-10-30

## 2023-10-30 RX ORDER — AMLODIPINE BESYLATE 5 MG/1
5 TABLET ORAL DAILY
Qty: 90 TABLET | Refills: 0 | Status: SHIPPED | OUTPATIENT
Start: 2023-10-30

## 2023-10-30 RX ORDER — ROSUVASTATIN CALCIUM 10 MG/1
10 TABLET, COATED ORAL DAILY
Qty: 90 TABLET | Refills: 0 | Status: SHIPPED | OUTPATIENT
Start: 2023-10-30

## 2023-12-06 DIAGNOSIS — Z12.11 SCREENING FOR MALIGNANT NEOPLASM OF COLON: Primary | ICD-10-CM

## 2023-12-07 ENCOUNTER — HOSPITAL ENCOUNTER (OUTPATIENT)
Facility: HOSPITAL | Age: 63
Discharge: HOME | End: 2023-12-07
Admitting: NURSE PRACTITIONER
Payer: COMMERCIAL

## 2023-12-07 DIAGNOSIS — R41.3 MEMORY LOSS: ICD-10-CM

## 2023-12-07 DIAGNOSIS — R20.2 NUMBNESS AND TINGLING: ICD-10-CM

## 2023-12-07 DIAGNOSIS — R20.0 NUMBNESS AND TINGLING: ICD-10-CM

## 2023-12-07 DIAGNOSIS — R29.6 MULTIPLE FALLS: ICD-10-CM

## 2023-12-07 PROCEDURE — 0 GADOBENATE DIMEGLUMINE 529 MG/ML SOLUTION: Performed by: NURSE PRACTITIONER

## 2023-12-07 PROCEDURE — A9577 INJ MULTIHANCE: HCPCS | Performed by: NURSE PRACTITIONER

## 2023-12-07 PROCEDURE — 70553 MRI BRAIN STEM W/O & W/DYE: CPT

## 2023-12-07 RX ADMIN — GADOBENATE DIMEGLUMINE 20 ML: 529 INJECTION, SOLUTION INTRAVENOUS at 15:20

## 2023-12-20 ENCOUNTER — OFFICE VISIT (OUTPATIENT)
Dept: NEUROLOGY | Facility: CLINIC | Age: 63
End: 2023-12-20
Payer: COMMERCIAL

## 2023-12-20 VITALS
DIASTOLIC BLOOD PRESSURE: 86 MMHG | BODY MASS INDEX: 36.57 KG/M2 | WEIGHT: 233 LBS | OXYGEN SATURATION: 98 % | HEIGHT: 67 IN | SYSTOLIC BLOOD PRESSURE: 142 MMHG | HEART RATE: 87 BPM

## 2023-12-20 DIAGNOSIS — E53.8 B12 DEFICIENCY: Primary | ICD-10-CM

## 2023-12-20 DIAGNOSIS — R20.2 NUMBNESS AND TINGLING: ICD-10-CM

## 2023-12-20 DIAGNOSIS — R29.6 MULTIPLE FALLS: ICD-10-CM

## 2023-12-20 DIAGNOSIS — R41.3 MEMORY LOSS: ICD-10-CM

## 2023-12-20 DIAGNOSIS — R20.0 NUMBNESS AND TINGLING: ICD-10-CM

## 2023-12-20 RX ORDER — CETIRIZINE HYDROCHLORIDE 10 MG/1
CAPSULE, LIQUID FILLED ORAL
COMMUNITY
Start: 2023-12-03

## 2023-12-20 RX ORDER — ALENDRONATE SODIUM 70 MG/1
TABLET ORAL
COMMUNITY
Start: 2023-12-12

## 2023-12-20 NOTE — PROGRESS NOTES
[facility] NEUROLOGY         Date of Visit: 2023    Name: Alice Akins    :  1960    PCP: Paul Lau MD    Visit Type: an initial evaluation         Subjective     Patient ID: Alice is a 63 y.o. female.         History of Present Illness  I had the pleasure of seeing your patient for the first time today.  As you may know she is a 63-year-old female here today for initial evaluation for complaints of multiple falls, balance difficulty, and numbness and tingling of the lower extremities and hands.  She was referred by her primary care physician.    History:    Patient does have medical history of hypertension and hypothyroid.    Patient states that she has been experiencing symptoms of balance difficulty and falls going on approximately 2 to 3 years now.  She states however the symptoms have become more frequent and she has had 2 falls in the last year resulting in injuries.  1 fall resulted in a hematoma to her scalp.  A CT of the head was performed at that time which was normal.  Her second fall resulted in a wound to her left shin resulting in wound management consultation.  She is still working on healing from this.  This was approximately 6 weeks ago.    Patient states that she has always had something that has caused the fall for example her foot will get caught on something or she seems to get caught up on something with her feet.  She states that she is trying to be more aware of her walking and has not had any recent falls since the most recent injury.  However she states that she is not sure what could be causing her to have more difficulty as this was not a problem that had been common for her in the past.  She does acknowledge some tingling or decreased sensation in her legs.  She states the left leg is worse especially on the medial part of her left shin where she had her injury.  She states she also has some tingling and numbness in the hands that will happen on  occasion however has become less frequent more recently.  She does have a previous history of being treated for B12 deficiency however has not been on any B12 replacement in the last 6 months or so.  She is not diabetic.  She has never received chemotherapy or radiation treatments.  She has never had EMG study done.  She does report some low back pain symptoms but no significant neck pain.  She denies bowel or bladder issues.  She does have an order for physical therapy to work with balance and gait training which she will be starting soon.  She has not had any previous imaging of the brain apart from the CT scan.      Current:    At last appointment we did check a vitamin B12 level which ended up being in the 200s.  We did start patient on B12 replacement.  We also ordered an EMG study which is scheduled for January.  Patient also had MRI of the brain which came back with some mild microvascular changes with no other significant abnormalities.    Patient states that she has actually had significant improvement in her symptoms since starting on B12 injections.  She is now on biweekly B12 replacement.  She states that the injections seem to have improved a lot of her balance difficulty and started to decrease a lot of her tingling symptoms.  She still has some numbness in the left shin which has not changed since starting the B12 where she had her previous injury.  She also still reports paresthesias in her fingers especially in the last 3 fingers on both hands.  She states that otherwise symptoms overall are much better.  She does report that memory symptoms seem to be improving.  Her energy levels are also improving.  She denies any new or worsening symptoms.  No other new neurological complaints at today's visit.      The following portions of the patient's history were reviewed and updated as appropriate: allergies, current medications, past family history, past medical history, past social history, past surgical  "history, and problem list.                 Review of Systems   Constitutional:  Negative for activity change, appetite change, fatigue and unexpected weight change.   HENT:  Negative for tinnitus and trouble swallowing.    Eyes:  Negative for visual disturbance.   Respiratory:  Negative for chest tightness and shortness of breath.    Gastrointestinal:  Negative for constipation, diarrhea, nausea and vomiting.   Genitourinary:  Negative for difficulty urinating, frequency and urgency.   Musculoskeletal:  Positive for back pain and gait problem. Negative for neck pain.   Neurological:  Positive for numbness. Negative for dizziness, tremors, seizures, syncope, facial asymmetry, speech difficulty, weakness, light-headedness and headaches.   Psychiatric/Behavioral:  Negative for confusion and sleep disturbance.             Current Medications:    Current Outpatient Medications   Medication Instructions    alendronate (FOSAMAX) 70 MG tablet     amLODIPine (NORVASC) 5 mg, Oral, Daily    busPIRone (BUSPAR) 30 mg, Oral, Daily    Cetirizine HCl (ZyrTEC Allergy) 10 MG capsule     cetirizine-pseudoephedrine (ZyrTEC-D) 5-120 MG per 12 hr tablet 1 tablet, Oral, 2 Times Daily    Coenzyme Q10 (Co Q-10) 300 MG capsule Oral    CRANBERRY  mg, Oral    cyanocobalamin 1000 MCG/ML injection Inject 1 ML IM Q w X4 weeks, then 1 ml IM qow x 4 months. Total of 12 injections.    Ginkgo Biloba 40 MG tablet 1 tablet, Oral, Daily    latanoprost (XALATAN) 0.005 % ophthalmic solution 1 drop, Both Eyes, Nightly    levothyroxine (SYNTHROID, LEVOTHROID) 25 mcg, Oral, Daily    Milk Thistle 500 MG capsule     rosuvastatin (CRESTOR) 10 mg, Oral, Daily    Syringe/Needle, Disp, (BD Eclipse Syringe) 25G X 1\" 3 ML misc Use with B12 Solution As Directed    Turmeric (QC TUMERIC COMPLEX PO) 800 mg, Oral    valACYclovir (VALTREX) 1,000 mg, Oral, 2 Times Daily    valsartan-hydrochlorothiazide (DIOVAN-HCT) 320-25 MG per tablet 1 tablet, Oral, Daily    " "venlafaxine XR (EFFEXOR-XR) 150 mg, Oral, Daily          /86   Pulse 87   Ht 170.2 cm (67\")   Wt 106 kg (233 lb)   SpO2 98%   BMI 36.49 kg/m²                Objective     Neurological Exam  Mental Status  Awake, alert and oriented to person, place and time. Speech is normal. Language is fluent with no aphasia.    Cranial Nerves  CN II: Visual fields full to confrontation.  CN III, IV, VI: Extraocular movements intact bilaterally. Normal lids and orbits bilaterally. Pupils equal round and reactive to light bilaterally.  CN V: Facial sensation is normal.  CN VII: Full and symmetric facial movement.  CN IX, X: Palate elevates symmetrically  CN XI: Shoulder shrug strength is normal.  CN XII: Tongue midline without atrophy or fasciculations.    Motor  Normal muscle bulk throughout. Normal muscle tone. No abnormal involuntary movements. Strength is 5/5 throughout all four extremities.    Sensory  Light touch abnormality: Decreased medial part of left shin normal elsewhere. Pinprick is normal in upper and lower extremities. Decreased cold to medial part of left shin normal elsewhere. Vibration abnormality: Decreased medial part of left shin normal elsewhere. Proprioception is normal in upper and lower extremities.     Reflexes  Deep tendon reflexes are 2+ and symmetric in all four extremities.    Coordination    Finger-to-nose, rapid alternating movements and heel-to-shin normal bilaterally without dysmetria.    Gait  Casual gait is normal including stance, stride, and arm swing. Toe walking abnormality: Heel walking abnormality: Tandem gait abnormality: Romberg is absent. Able to rise from chair without using arms.      Physical Exam  Constitutional:       Appearance: Normal appearance. She is normal weight.   Eyes:      General: Lids are normal.      Extraocular Movements: Extraocular movements intact.      Pupils: Pupils are equal, round, and reactive to light.   Pulmonary:      Effort: Pulmonary effort is " normal.   Skin:     General: Skin is warm.          Neurological:      Motor: Motor strength is normal.     Coordination: Coordination is intact. Romberg sign negative.      Deep Tendon Reflexes: Reflexes are normal and symmetric.   Psychiatric:         Mood and Affect: Mood normal.         Speech: Speech normal.         Behavior: Behavior normal.                   Assessment & Plan     Diagnoses and all orders for this visit:    1. B12 deficiency (Primary)    2. Numbness and tingling    3. Multiple falls    4. Memory loss         At this time we will go ahead and continue patient on B12 injection replacement as this seems to be improving a lot of patient's symptoms.    I would still like to go forward with EMG study due to some persistent symptoms she is experiencing and hands as well as the left shin.    Plan for follow-up in March which will be when she is due for B12 recheck as well as to go over EMG results.            Suzi ALEJANDRE    Neurology    [facility] [place]    Phone: [phone]    12/20/2023 , 13:41 EST Low B12 level. Recommend once weekly B12 injections x 4 weeks followed by biweekly injections x 4 months with repeat lab draw 1 month from last injection. Can you call patient?

## 2024-01-24 DIAGNOSIS — I10 PRIMARY HYPERTENSION: ICD-10-CM

## 2024-01-24 RX ORDER — ROSUVASTATIN CALCIUM 10 MG/1
10 TABLET, COATED ORAL DAILY
Qty: 90 TABLET | Refills: 0 | Status: SHIPPED | OUTPATIENT
Start: 2024-01-24

## 2024-01-24 RX ORDER — BUSPIRONE HYDROCHLORIDE 30 MG/1
30 TABLET ORAL DAILY
Qty: 90 TABLET | Refills: 0 | Status: SHIPPED | OUTPATIENT
Start: 2024-01-24

## 2024-01-24 RX ORDER — VALSARTAN AND HYDROCHLOROTHIAZIDE 320; 25 MG/1; MG/1
1 TABLET, FILM COATED ORAL DAILY
Qty: 90 TABLET | Refills: 0 | Status: SHIPPED | OUTPATIENT
Start: 2024-01-24 | End: 2025-01-23

## 2024-01-24 RX ORDER — AMLODIPINE BESYLATE 5 MG/1
5 TABLET ORAL DAILY
Qty: 90 TABLET | Refills: 0 | Status: SHIPPED | OUTPATIENT
Start: 2024-01-24

## 2024-01-24 RX ORDER — LEVOTHYROXINE SODIUM 0.03 MG/1
25 TABLET ORAL DAILY
Qty: 90 TABLET | Refills: 0 | Status: SHIPPED | OUTPATIENT
Start: 2024-01-24

## 2024-01-24 RX ORDER — LEVOTHYROXINE SODIUM 0.03 MG/1
25 TABLET ORAL DAILY
Qty: 90 TABLET | Refills: 0 | Status: SHIPPED | OUTPATIENT
Start: 2024-01-24 | End: 2024-01-24

## 2024-01-29 ENCOUNTER — HOSPITAL ENCOUNTER (OUTPATIENT)
Dept: INFUSION THERAPY | Facility: HOSPITAL | Age: 64
Discharge: HOME | End: 2024-01-29
Admitting: PSYCHIATRY & NEUROLOGY
Payer: COMMERCIAL

## 2024-01-29 DIAGNOSIS — R20.2 NUMBNESS AND TINGLING: ICD-10-CM

## 2024-01-29 DIAGNOSIS — R20.0 NUMBNESS AND TINGLING: ICD-10-CM

## 2024-01-29 PROCEDURE — 95909 NRV CNDJ TST 5-6 STUDIES: CPT

## 2024-01-29 PROCEDURE — 95909 NRV CNDJ TST 5-6 STUDIES: CPT | Performed by: PSYCHIATRY & NEUROLOGY

## 2024-01-29 PROCEDURE — 95886 MUSC TEST DONE W/N TEST COMP: CPT

## 2024-01-29 PROCEDURE — 95886 MUSC TEST DONE W/N TEST COMP: CPT | Performed by: PSYCHIATRY & NEUROLOGY

## 2024-01-29 NOTE — PROCEDURES
"EMG and Nerve Conduction Studies    I.      Instrument used: Neuromax 1002  II.     Please see data sheets for tabular summary of NCS and details on methods, temperatures and lab standards.   III.    EMG muscles tested for upper extremity studies include the deltoid, biceps, triceps, pronator teres, extensor digitorum communis, first dorsal interosseous and abductor pollicis brevis.    IV.   EMG muscles tested for lower extremity studies include the vastus lateralis, tibialis anterior, peroneus longus, medial gastrocnemius and extensor digitorum brevis.    V.    Additional muscles tested as needed.  Paraspinal muscles tested as needed.   VI.   Please see data sheets for tabular summary of EMG findings.   VII. The complete report includes the data sheets.      Indication: Numbness in the left leg and balance trouble  History: 63-year-old woman with troubles falling but also has some numbness in the medial aspect of the left lower leg.  Symptoms have been present about a year.  She had a left total knee replacement in 2018 and then about 7 months ago she had a laceration just below the knee medially which got infected and required wound care.  The numbness did not correlate with either of these events.  She does have back pain but it does not radiate into her legs.  Denies history of diabetes but has treated hypothyroidism.      Ht: 67 inches  Wt: 225 pounds  HbA1C: No results found for: \"HGBA1C\"  TSH:   Lab Results   Component Value Date    TSH 0.880 10/05/2023       Technical summary:  Nerve conduction studies were obtained in the left leg with 1 comparison on the right.  Skin temperatures were a bit cold and so the feet were warmed prior to study.  Temperature correction was not needed.  Needle examination was obtained on selected muscles in both legs.    Results:  1.  Normal left sural sensory distal latency with low amplitude of 2.3 µV.  2.  Normal superficial peroneal sensory distal latencies bilaterally with " low amplitudes of 3.7 µV on the left and 3.3 µV on the right.  3.  Normal left peroneal motor conduction velocities with a normal distal latency but low amplitude of 1.1 mV from ankle stimulation.  4.  Normal left tibial motor conduction velocity with normal distal latency and amplitudes.  5.  Needle examination of selected muscles in both legs showed fibrillations and positive sharp waves in the left extensor digitorum brevis.  The motor units were normal but the firing rate was increased and the interference pattern was reduced.  All other muscles tested in both legs showed normal insertional activities, motor units and recruitment patterns.  Lumbar paraspinals at L5 showed no abnormality on either side.    Impression:  Abnormal study consistent with axonal peripheral neuropathy.  There were no additional findings to suggest a lumbosacral radiculopathy on either side.  The needle exam changes were consistent with the nerve conduction findings.  Study results were discussed with the patient.    Bud Reina M.D.              Dictated utilizing Dragon dictation.

## 2024-02-20 DIAGNOSIS — E53.8 B12 DEFICIENCY: ICD-10-CM

## 2024-02-20 RX ORDER — CYANOCOBALAMIN 1000 UG/ML
INJECTION, SOLUTION INTRAMUSCULAR; SUBCUTANEOUS
Qty: 12 ML | Refills: 0 | Status: SHIPPED | OUTPATIENT
Start: 2024-02-20

## 2024-02-28 DIAGNOSIS — E53.8 B12 DEFICIENCY: ICD-10-CM

## 2024-02-28 RX ORDER — NEEDLES, SAFETY 18GX1 1/2"
NEEDLE, DISPOSABLE MISCELLANEOUS
Qty: 12 EACH | Refills: 0 | Status: SHIPPED | OUTPATIENT
Start: 2024-02-28

## 2024-03-13 ENCOUNTER — LAB (OUTPATIENT)
Dept: LAB | Facility: HOSPITAL | Age: 64
End: 2024-03-13
Payer: COMMERCIAL

## 2024-03-13 ENCOUNTER — OFFICE VISIT (OUTPATIENT)
Dept: NEUROLOGY | Facility: CLINIC | Age: 64
End: 2024-03-13
Payer: COMMERCIAL

## 2024-03-13 VITALS
HEART RATE: 78 BPM | BODY MASS INDEX: 36.1 KG/M2 | DIASTOLIC BLOOD PRESSURE: 70 MMHG | OXYGEN SATURATION: 98 % | HEIGHT: 67 IN | SYSTOLIC BLOOD PRESSURE: 124 MMHG | WEIGHT: 230 LBS

## 2024-03-13 DIAGNOSIS — R29.6 MULTIPLE FALLS: ICD-10-CM

## 2024-03-13 DIAGNOSIS — Z74.09 IMPAIRED FUNCTIONAL MOBILITY, BALANCE, GAIT, AND ENDURANCE: ICD-10-CM

## 2024-03-13 DIAGNOSIS — E53.8 VITAMIN B12 DEFICIENCY: ICD-10-CM

## 2024-03-13 DIAGNOSIS — G60.9 IDIOPATHIC PERIPHERAL NEUROPATHY: Primary | ICD-10-CM

## 2024-03-13 PROBLEM — E78.00 PURE HYPERCHOLESTEROLEMIA: Status: ACTIVE | Noted: 2024-03-13

## 2024-03-13 PROBLEM — K75.9 INFLAMMATORY LIVER DISEASE, UNSPECIFIED: Status: ACTIVE | Noted: 2024-03-13

## 2024-03-13 PROBLEM — M54.9 BACK PAIN: Status: ACTIVE | Noted: 2024-03-13

## 2024-03-13 PROBLEM — F32.9 MAJOR DEPRESSIVE DISORDER, SINGLE EPISODE, UNSPECIFIED: Status: ACTIVE | Noted: 2024-03-13

## 2024-03-13 PROBLEM — M19.90 ARTHRITIS: Status: ACTIVE | Noted: 2024-03-13

## 2024-03-13 PROBLEM — K21.00 GASTRO-ESOPHAGEAL REFLUX DISEASE WITH ESOPHAGITIS: Status: ACTIVE | Noted: 2024-03-13

## 2024-03-13 LAB
CRP SERPL-MCNC: <0.3 MG/DL (ref 0–0.5)
ERYTHROCYTE [SEDIMENTATION RATE] IN BLOOD: 17 MM/HR (ref 0–30)

## 2024-03-13 PROCEDURE — 86038 ANTINUCLEAR ANTIBODIES: CPT | Performed by: NURSE PRACTITIONER

## 2024-03-13 PROCEDURE — 85652 RBC SED RATE AUTOMATED: CPT | Performed by: NURSE PRACTITIONER

## 2024-03-13 PROCEDURE — 83825 ASSAY OF MERCURY: CPT | Performed by: NURSE PRACTITIONER

## 2024-03-13 PROCEDURE — 83655 ASSAY OF LEAD: CPT | Performed by: NURSE PRACTITIONER

## 2024-03-13 PROCEDURE — 36415 COLL VENOUS BLD VENIPUNCTURE: CPT | Performed by: NURSE PRACTITIONER

## 2024-03-13 PROCEDURE — 84446 ASSAY OF VITAMIN E: CPT | Performed by: NURSE PRACTITIONER

## 2024-03-13 PROCEDURE — 86140 C-REACTIVE PROTEIN: CPT | Performed by: NURSE PRACTITIONER

## 2024-03-13 PROCEDURE — 82525 ASSAY OF COPPER: CPT | Performed by: NURSE PRACTITIONER

## 2024-03-13 PROCEDURE — 84165 PROTEIN E-PHORESIS SERUM: CPT | Performed by: NURSE PRACTITIONER

## 2024-03-13 PROCEDURE — 84155 ASSAY OF PROTEIN SERUM: CPT | Performed by: NURSE PRACTITIONER

## 2024-03-13 PROCEDURE — 86235 NUCLEAR ANTIGEN ANTIBODY: CPT | Performed by: NURSE PRACTITIONER

## 2024-03-13 PROCEDURE — 82784 ASSAY IGA/IGD/IGG/IGM EACH: CPT | Performed by: NURSE PRACTITIONER

## 2024-03-13 PROCEDURE — 84425 ASSAY OF VITAMIN B-1: CPT | Performed by: NURSE PRACTITIONER

## 2024-03-13 PROCEDURE — 86364 TISS TRNSGLTMNASE EA IG CLAS: CPT | Performed by: NURSE PRACTITIONER

## 2024-03-13 PROCEDURE — 86258 DGP ANTIBODY EACH IG CLASS: CPT | Performed by: NURSE PRACTITIONER

## 2024-03-13 PROCEDURE — 83521 IG LIGHT CHAINS FREE EACH: CPT | Performed by: NURSE PRACTITIONER

## 2024-03-13 PROCEDURE — 82175 ASSAY OF ARSENIC: CPT | Performed by: NURSE PRACTITIONER

## 2024-03-13 PROCEDURE — 84207 ASSAY OF VITAMIN B-6: CPT | Performed by: NURSE PRACTITIONER

## 2024-03-13 NOTE — PROGRESS NOTES
[facility] NEUROLOGY         Date of Visit: 3/13/2024    Name: Alice Akins    :  1960    PCP: Paul Lau MD    Visit Type: an initial evaluation         Subjective     Patient ID: Alice is a 63 y.o. female.         History of Present Illness  I had the pleasure of seeing your patient today.  As you may know she is a 63-year-old female here today for follow-up for complaints of multiple falls, balance difficulty, and numbness and tingling of the lower extremities and hands.  She was referred by her primary care physician.    History:    Patient does have medical history of hypertension and hypothyroid.    Patient states that she has been experiencing symptoms of balance difficulty and falls going on approximately 2 to 3 years now.  She states however the symptoms have become more frequent and she has had 2 falls in the last year resulting in injuries.  1 fall resulted in a hematoma to her scalp.  A CT of the head was performed at that time which was normal.  Her second fall resulted in a wound to her left shin resulting in wound management consultation.  She is still working on healing from this.  This was approximately 6 weeks ago.    Patient states that she has always had something that has caused the fall for example her foot will get caught on something or she seems to get caught up on something with her feet.  She states that she is trying to be more aware of her walking and has not had any recent falls since the most recent injury.  However she states that she is not sure what could be causing her to have more difficulty as this was not a problem that had been common for her in the past.  She does acknowledge some tingling or decreased sensation in her legs.  She states the left leg is worse especially on the medial part of her left shin where she had her injury.  She states she also has some tingling and numbness in the hands that will happen on occasion however has become  less frequent more recently.  She does have a previous history of being treated for B12 deficiency however has not been on any B12 replacement in the last 6 months or so.  She is not diabetic.  She has never received chemotherapy or radiation treatments.  She has never had EMG study done.  She does report some low back pain symptoms but no significant neck pain.  She denies bowel or bladder issues.  She does have an order for physical therapy to work with balance and gait training which she will be starting soon.  She has not had any previous imaging of the brain apart from the CT scan.      In November patient was started on B12 injections for vitamin B12 deficiency in the 200s.  She also had an MRI of the brain which came back within normal limits.  Patient did end up undergoing EMG study in January which revealed evidence for peripheral neuropathy with no radiculopathies or other abnormalities.    Current:    Patient states that overall she continues to have some balance healthy as well as decreased sensation in the feet.  She denies any pain or paresthesia symptoms.  She does report occasional tingling in the hands.  She does state that memory symptoms seem to have improved since being on the B12.  She denies any significant falls with injury and has had potentially 2 falls since her last appointment which happened because she caught her toe on something.  She denies any bowel or bladder issues.  No other new neurological complaints at today's visit.      The following portions of the patient's history were reviewed and updated as appropriate: allergies, current medications, past family history, past medical history, past social history, past surgical history, and problem list.                 Review of Systems   Constitutional:  Negative for activity change, appetite change, fatigue and unexpected weight change.   HENT:  Negative for tinnitus and trouble swallowing.    Eyes:  Negative for visual disturbance.  "  Respiratory:  Negative for chest tightness and shortness of breath.    Gastrointestinal:  Negative for constipation, diarrhea, nausea and vomiting.   Genitourinary:  Negative for difficulty urinating, frequency and urgency.   Musculoskeletal:  Positive for back pain and gait problem. Negative for neck pain.   Neurological:  Positive for numbness. Negative for dizziness, tremors, seizures, syncope, facial asymmetry, speech difficulty, weakness, light-headedness and headaches.   Psychiatric/Behavioral:  Negative for confusion and sleep disturbance.             Current Medications:    Current Outpatient Medications   Medication Instructions    alendronate (FOSAMAX) 70 MG tablet     amLODIPine (NORVASC) 5 mg, Oral, Daily    busPIRone (BUSPAR) 30 mg, Oral, Daily    Cetirizine HCl (ZyrTEC Allergy) 10 MG capsule     Coenzyme Q10 (Co Q-10) 300 MG capsule Oral    cyanocobalamin 1000 MCG/ML injection inject 1ml intramuscluraly every week for 4 weeks then 1 ml every other week for 4 months    Ginkgo Biloba 40 MG tablet 1 tablet, Oral, Daily    latanoprost (XALATAN) 0.005 % ophthalmic solution 1 drop, Both Eyes, Nightly    levothyroxine (SYNTHROID, LEVOTHROID) 25 mcg, Oral, Daily    Milk Thistle 500 MG capsule     rosuvastatin (CRESTOR) 10 mg, Oral, Daily    Syringe/Needle, Disp, (BD Eclipse Syringe) 25G X 1\" 3 ML misc Use with B12 Solution As Directed    Turmeric (QC TUMERIC COMPLEX PO) 800 mg, Oral    valACYclovir (VALTREX) 1,000 mg, Oral, 2 Times Daily    valsartan-hydrochlorothiazide (DIOVAN-HCT) 320-25 MG per tablet 1 tablet, Oral, Daily    venlafaxine XR (EFFEXOR-XR) 150 mg, Oral, Daily          /70   Pulse 78   Ht 170.2 cm (67\")   Wt 104 kg (230 lb)   SpO2 98%   BMI 36.02 kg/m²                Objective     Neurological Exam  Mental Status  Awake, alert and oriented to person, place and time. Speech is normal. Language is fluent with no aphasia.    Cranial Nerves  CN II: Visual fields full to " confrontation.  CN III, IV, VI: Extraocular movements intact bilaterally. Normal lids and orbits bilaterally. Pupils equal round and reactive to light bilaterally.  CN V: Facial sensation is normal.  CN VII: Full and symmetric facial movement.  CN IX, X: Palate elevates symmetrically  CN XI: Shoulder shrug strength is normal.  CN XII: Tongue midline without atrophy or fasciculations.    Motor  Normal muscle bulk throughout. Normal muscle tone. No abnormal involuntary movements. Strength is 5/5 throughout all four extremities.    Sensory  Light touch abnormality: Decreased medial part of left shin normal elsewhere. Pinprick is normal in upper and lower extremities. Decreased cold to medial part of left shin normal elsewhere. Vibration abnormality: Decreased medial part of left shin normal elsewhere. Proprioception is normal in upper and lower extremities.     Reflexes  Deep tendon reflexes are 2+ and symmetric in all four extremities.    Coordination    Finger-to-nose, rapid alternating movements and heel-to-shin normal bilaterally without dysmetria.    Gait  Casual gait is normal including stance, stride, and arm swing. Toe walking abnormality: Heel walking abnormality: Tandem gait abnormality: Romberg is absent. Able to rise from chair without using arms.      Physical Exam  Constitutional:       Appearance: Normal appearance. She is normal weight.   Eyes:      General: Lids are normal.      Extraocular Movements: Extraocular movements intact.      Pupils: Pupils are equal, round, and reactive to light.   Pulmonary:      Effort: Pulmonary effort is normal.   Skin:     General: Skin is warm.          Neurological:      Motor: Motor strength is normal.     Coordination: Coordination is intact. Romberg sign negative.      Deep Tendon Reflexes: Reflexes are normal and symmetric.   Psychiatric:         Mood and Affect: Mood normal.         Speech: Speech normal.         Behavior: Behavior normal.                      Assessment & Plan     Diagnoses and all orders for this visit:    1. Idiopathic peripheral neuropathy (Primary)  -     JEAN CARLOS  -     C-reactive Protein  -     Copper, Serum  -     Heavy Metals, Blood  -     Celiac Disease Antibody Screen  -     Vitamin B6  -     Vitamin E  -     Vitamin B1, Whole Blood  -     Sjogren's Antibody, Anti-SS-A / -SS-B  -     Protein Elec + Interp, Serum  -     Immunoglobulin Free LT Chains Blood  -     Sedimentation Rate  -     Ambulatory Referral to Physical Therapy    2. Multiple falls  -     Ambulatory Referral to Physical Therapy    3. Impaired functional mobility, balance, gait, and endurance  -     Ambulatory Referral to Physical Therapy    4. Vitamin B12 deficiency         At this time we did discuss results of EMG study.    I would like to check lab work to look for any potential additional causes for patient's neuropathy symptoms.    She should continue B12 replacement for vitamin B12 deficiency.    We will also go ahead and refer her to physical therapy for treatment of balance and gait issues.    Follow-up in 3 months or sooner if needed.            Suzi ALEJANDRE    Neurology    [facility] [place]    Phone: [phone]

## 2024-03-13 NOTE — LETTER
2024       No Recipients    Patient: Alice Akins   YOB: 1960   Date of Visit: 3/13/2024     Dear Paul Lau MD:       Thank you for referring Alice Akins to me for evaluation. Below are the relevant portions of my assessment and plan of care.    If you have questions, please do not hesitate to call me. I look forward to following Alice along with you.         Sincerely,        PILI Boyce        CC:   No Recipients    Suzi Dodson APRN  [phone]  Sign when Signing Visit  [facility] NEUROLOGY         Date of Visit: 3/13/2024    Name: Alice Akins    :  1960    PCP: Paul Lau MD    Visit Type: an initial evaluation         Subjective    Patient ID: Alice is a 63 y.o. female.         History of Present Illness  I had the pleasure of seeing your patient today.  As you may know she is a 63-year-old female here today for follow-up for complaints of multiple falls, balance difficulty, and numbness and tingling of the lower extremities and hands.  She was referred by her primary care physician.    History:    Patient does have medical history of hypertension and hypothyroid.    Patient states that she has been experiencing symptoms of balance difficulty and falls going on approximately 2 to 3 years now.  She states however the symptoms have become more frequent and she has had 2 falls in the last year resulting in injuries.  1 fall resulted in a hematoma to her scalp.  A CT of the head was performed at that time which was normal.  Her second fall resulted in a wound to her left shin resulting in wound management consultation.  She is still working on healing from this.  This was approximately 6 weeks ago.    Patient states that she has always had something that has caused the fall for example her foot will get caught on something or she seems to get caught up on something with her feet.  She states that she is trying to be more aware of  her walking and has not had any recent falls since the most recent injury.  However she states that she is not sure what could be causing her to have more difficulty as this was not a problem that had been common for her in the past.  She does acknowledge some tingling or decreased sensation in her legs.  She states the left leg is worse especially on the medial part of her left shin where she had her injury.  She states she also has some tingling and numbness in the hands that will happen on occasion however has become less frequent more recently.  She does have a previous history of being treated for B12 deficiency however has not been on any B12 replacement in the last 6 months or so.  She is not diabetic.  She has never received chemotherapy or radiation treatments.  She has never had EMG study done.  She does report some low back pain symptoms but no significant neck pain.  She denies bowel or bladder issues.  She does have an order for physical therapy to work with balance and gait training which she will be starting soon.  She has not had any previous imaging of the brain apart from the CT scan.      In November patient was started on B12 injections for vitamin B12 deficiency in the 200s.  She also had an MRI of the brain which came back within normal limits.  Patient did end up undergoing EMG study in January which revealed evidence for peripheral neuropathy with no radiculopathies or other abnormalities.    Current:    Patient states that overall she continues to have some balance healthy as well as decreased sensation in the feet.  She denies any pain or paresthesia symptoms.  She does report occasional tingling in the hands.  She does state that memory symptoms seem to have improved since being on the B12.  She denies any significant falls with injury and has had potentially 2 falls since her last appointment which happened because she caught her toe on something.  She denies any bowel or bladder issues.   "No other new neurological complaints at today's visit.      The following portions of the patient's history were reviewed and updated as appropriate: allergies, current medications, past family history, past medical history, past social history, past surgical history, and problem list.                 Review of Systems   Constitutional:  Negative for activity change, appetite change, fatigue and unexpected weight change.   HENT:  Negative for tinnitus and trouble swallowing.    Eyes:  Negative for visual disturbance.   Respiratory:  Negative for chest tightness and shortness of breath.    Gastrointestinal:  Negative for constipation, diarrhea, nausea and vomiting.   Genitourinary:  Negative for difficulty urinating, frequency and urgency.   Musculoskeletal:  Positive for back pain and gait problem. Negative for neck pain.   Neurological:  Positive for numbness. Negative for dizziness, tremors, seizures, syncope, facial asymmetry, speech difficulty, weakness, light-headedness and headaches.   Psychiatric/Behavioral:  Negative for confusion and sleep disturbance.             Current Medications:    Current Outpatient Medications   Medication Instructions   • alendronate (FOSAMAX) 70 MG tablet    • amLODIPine (NORVASC) 5 mg, Oral, Daily   • busPIRone (BUSPAR) 30 mg, Oral, Daily   • Cetirizine HCl (ZyrTEC Allergy) 10 MG capsule    • Coenzyme Q10 (Co Q-10) 300 MG capsule Oral   • cyanocobalamin 1000 MCG/ML injection inject 1ml intramuscluraly every week for 4 weeks then 1 ml every other week for 4 months   • Ginkgo Biloba 40 MG tablet 1 tablet, Oral, Daily   • latanoprost (XALATAN) 0.005 % ophthalmic solution 1 drop, Both Eyes, Nightly   • levothyroxine (SYNTHROID, LEVOTHROID) 25 mcg, Oral, Daily   • Milk Thistle 500 MG capsule    • rosuvastatin (CRESTOR) 10 mg, Oral, Daily   • Syringe/Needle, Disp, (BD Eclipse Syringe) 25G X 1\" 3 ML misc Use with B12 Solution As Directed   • Turmeric (QC TUMERIC COMPLEX PO) 800 mg, Oral " "  • valACYclovir (VALTREX) 1,000 mg, Oral, 2 Times Daily   • valsartan-hydrochlorothiazide (DIOVAN-HCT) 320-25 MG per tablet 1 tablet, Oral, Daily   • venlafaxine XR (EFFEXOR-XR) 150 mg, Oral, Daily          /70   Pulse 78   Ht 170.2 cm (67\")   Wt 104 kg (230 lb)   SpO2 98%   BMI 36.02 kg/m²                Objective    Neurological Exam  Mental Status  Awake, alert and oriented to person, place and time. Speech is normal. Language is fluent with no aphasia.    Cranial Nerves  CN II: Visual fields full to confrontation.  CN III, IV, VI: Extraocular movements intact bilaterally. Normal lids and orbits bilaterally. Pupils equal round and reactive to light bilaterally.  CN V: Facial sensation is normal.  CN VII: Full and symmetric facial movement.  CN IX, X: Palate elevates symmetrically  CN XI: Shoulder shrug strength is normal.  CN XII: Tongue midline without atrophy or fasciculations.    Motor  Normal muscle bulk throughout. Normal muscle tone. No abnormal involuntary movements. Strength is 5/5 throughout all four extremities.    Sensory  Light touch abnormality: Decreased medial part of left shin normal elsewhere. Pinprick is normal in upper and lower extremities. Decreased cold to medial part of left shin normal elsewhere. Vibration abnormality: Decreased medial part of left shin normal elsewhere. Proprioception is normal in upper and lower extremities.     Reflexes  Deep tendon reflexes are 2+ and symmetric in all four extremities.    Coordination    Finger-to-nose, rapid alternating movements and heel-to-shin normal bilaterally without dysmetria.    Gait  Casual gait is normal including stance, stride, and arm swing. Toe walking abnormality: Heel walking abnormality: Tandem gait abnormality: Romberg is absent. Able to rise from chair without using arms.      Physical Exam  Constitutional:       Appearance: Normal appearance. She is normal weight.   Eyes:      General: Lids are normal.      Extraocular " Movements: Extraocular movements intact.      Pupils: Pupils are equal, round, and reactive to light.   Pulmonary:      Effort: Pulmonary effort is normal.   Skin:     General: Skin is warm.          Neurological:      Motor: Motor strength is normal.     Coordination: Coordination is intact. Romberg sign negative.      Deep Tendon Reflexes: Reflexes are normal and symmetric.   Psychiatric:         Mood and Affect: Mood normal.         Speech: Speech normal.         Behavior: Behavior normal.                     Assessment & Plan    Diagnoses and all orders for this visit:    1. Idiopathic peripheral neuropathy (Primary)  -     JEAN CARLOS  -     C-reactive Protein  -     Copper, Serum  -     Heavy Metals, Blood  -     Celiac Disease Antibody Screen  -     Vitamin B6  -     Vitamin E  -     Vitamin B1, Whole Blood  -     Sjogren's Antibody, Anti-SS-A / -SS-B  -     Protein Elec + Interp, Serum  -     Immunoglobulin Free LT Chains Blood  -     Sedimentation Rate  -     Ambulatory Referral to Physical Therapy    2. Multiple falls  -     Ambulatory Referral to Physical Therapy    3. Impaired functional mobility, balance, gait, and endurance  -     Ambulatory Referral to Physical Therapy    4. Vitamin B12 deficiency         At this time we did discuss results of EMG study.    I would like to check lab work to look for any potential additional causes for patient's neuropathy symptoms.    She should continue B12 replacement for vitamin B12 deficiency.    We will also go ahead and refer her to physical therapy for treatment of balance and gait issues.    Follow-up in 3 months or sooner if needed.            Suzi ALEJANDRE    Neurology    [facility] [place]    Phone: [phone]

## 2024-03-14 LAB
ALBUMIN SERPL ELPH-MCNC: 3.7 G/DL (ref 2.9–4.4)
ALBUMIN/GLOB SERPL: 1.3 {RATIO} (ref 0.7–1.7)
ALPHA1 GLOB SERPL ELPH-MCNC: 0.3 G/DL (ref 0–0.4)
ALPHA2 GLOB SERPL ELPH-MCNC: 0.8 G/DL (ref 0.4–1)
ANA SER QL: NEGATIVE
B-GLOBULIN SERPL ELPH-MCNC: 1.2 G/DL (ref 0.7–1.3)
ENA SS-A AB SER-ACNC: <0.2 AI (ref 0–0.9)
ENA SS-B AB SER-ACNC: <0.2 AI (ref 0–0.9)
GAMMA GLOB SERPL ELPH-MCNC: 0.7 G/DL (ref 0.4–1.8)
GLIADIN PEPTIDE IGA SER-ACNC: 8 UNITS (ref 0–19)
GLOBULIN SER CALC-MCNC: 2.9 G/DL (ref 2.2–3.9)
IGA SERPL-MCNC: 263 MG/DL (ref 87–352)
KAPPA LC FREE SER-MCNC: 16.9 MG/L (ref 3.3–19.4)
KAPPA LC FREE/LAMBDA FREE SER: 1.37 {RATIO} (ref 0.26–1.65)
LABORATORY COMMENT REPORT: NORMAL
LAMBDA LC FREE SERPL-MCNC: 12.3 MG/L (ref 5.7–26.3)
M PROTEIN SERPL ELPH-MCNC: NORMAL G/DL
PROT PATTERN SERPL ELPH-IMP: NORMAL
PROT SERPL-MCNC: 6.6 G/DL (ref 6–8.5)
TTG IGA SER-ACNC: <2 U/ML (ref 0–3)

## 2024-03-15 LAB — COPPER SERPL-MCNC: 131 UG/DL (ref 80–158)

## 2024-03-16 LAB
PYRIDOXAL PHOS SERPL-MCNC: 13.5 UG/L (ref 3.4–65.2)
VIT B1 BLD-SCNC: 80.9 NMOL/L (ref 66.5–200)

## 2024-03-21 LAB
A-TOCOPHEROL VIT E SERPL-MCNC: 9.3 MG/L (ref 9–29)
GAMMA TOCOPHEROL SERPL-MCNC: 1.5 MG/L (ref 0.5–4.9)

## 2024-03-22 VITALS
HEART RATE: 72 BPM | DIASTOLIC BLOOD PRESSURE: 72 MMHG | RESPIRATION RATE: 13 BRPM | DIASTOLIC BLOOD PRESSURE: 73 MMHG | DIASTOLIC BLOOD PRESSURE: 65 MMHG | WEIGHT: 228 LBS | HEART RATE: 70 BPM | OXYGEN SATURATION: 97 % | HEART RATE: 69 BPM | HEIGHT: 67 IN | RESPIRATION RATE: 18 BRPM | HEART RATE: 76 BPM | OXYGEN SATURATION: 96 % | OXYGEN SATURATION: 99 % | TEMPERATURE: 97.1 F | HEART RATE: 67 BPM | HEART RATE: 62 BPM | HEART RATE: 68 BPM | DIASTOLIC BLOOD PRESSURE: 87 MMHG | DIASTOLIC BLOOD PRESSURE: 74 MMHG | RESPIRATION RATE: 19 BRPM | SYSTOLIC BLOOD PRESSURE: 132 MMHG | SYSTOLIC BLOOD PRESSURE: 128 MMHG | RESPIRATION RATE: 11 BRPM | DIASTOLIC BLOOD PRESSURE: 67 MMHG | SYSTOLIC BLOOD PRESSURE: 129 MMHG | OXYGEN SATURATION: 98 % | SYSTOLIC BLOOD PRESSURE: 118 MMHG | HEART RATE: 66 BPM | RESPIRATION RATE: 16 BRPM | RESPIRATION RATE: 15 BRPM | RESPIRATION RATE: 12 BRPM | SYSTOLIC BLOOD PRESSURE: 123 MMHG | SYSTOLIC BLOOD PRESSURE: 121 MMHG | SYSTOLIC BLOOD PRESSURE: 116 MMHG | SYSTOLIC BLOOD PRESSURE: 113 MMHG | SYSTOLIC BLOOD PRESSURE: 135 MMHG | OXYGEN SATURATION: 85 % | SYSTOLIC BLOOD PRESSURE: 110 MMHG | DIASTOLIC BLOOD PRESSURE: 71 MMHG

## 2024-03-23 LAB
ARSENIC BLD-MCNC: 3 UG/L (ref 0–9)
LEAD BLDV-MCNC: <1 UG/DL (ref 0–3.4)
MERCURY BLD-MCNC: <1 UG/L (ref 0–14.9)

## 2024-03-26 ENCOUNTER — AMBULATORY SURGICAL CENTER (AMBULATORY)
Dept: URBAN - METROPOLITAN AREA SURGERY 17 | Facility: SURGERY | Age: 64
End: 2024-03-26
Payer: COMMERCIAL

## 2024-03-26 ENCOUNTER — OFFICE (AMBULATORY)
Dept: URBAN - METROPOLITAN AREA PATHOLOGY 4 | Facility: PATHOLOGY | Age: 64
End: 2024-03-26
Payer: COMMERCIAL

## 2024-03-26 DIAGNOSIS — Z86.010 PERSONAL HISTORY OF COLONIC POLYPS: ICD-10-CM

## 2024-03-26 DIAGNOSIS — D12.2 BENIGN NEOPLASM OF ASCENDING COLON: ICD-10-CM

## 2024-03-26 DIAGNOSIS — Z09 ENCOUNTER FOR FOLLOW-UP EXAMINATION AFTER COMPLETED TREATMEN: ICD-10-CM

## 2024-03-26 DIAGNOSIS — Z80.0 FAMILY HISTORY OF MALIGNANT NEOPLASM OF DIGESTIVE ORGANS: ICD-10-CM

## 2024-03-26 PROBLEM — K63.5 POLYP OF COLON: Status: ACTIVE | Noted: 2024-03-26

## 2024-03-26 LAB
GI HISTOLOGY: A. PROXIMAL ASCENDING COLON: (no result)
GI HISTOLOGY: B. MID-ASCENDING COLON: (no result)
GI HISTOLOGY: PDF REPORT: (no result)

## 2024-03-26 PROCEDURE — 45380 COLONOSCOPY AND BIOPSY: CPT | Mod: 33 | Performed by: INTERNAL MEDICINE

## 2024-03-26 PROCEDURE — 88305 TISSUE EXAM BY PATHOLOGIST: CPT | Performed by: PATHOLOGY

## 2024-04-10 ENCOUNTER — OFFICE VISIT (OUTPATIENT)
Dept: FAMILY MEDICINE CLINIC | Facility: CLINIC | Age: 64
End: 2024-04-10
Payer: COMMERCIAL

## 2024-04-10 VITALS
OXYGEN SATURATION: 97 % | BODY MASS INDEX: 33.59 KG/M2 | HEIGHT: 67 IN | DIASTOLIC BLOOD PRESSURE: 72 MMHG | SYSTOLIC BLOOD PRESSURE: 140 MMHG | WEIGHT: 214 LBS | RESPIRATION RATE: 16 BRPM | HEART RATE: 75 BPM

## 2024-04-10 DIAGNOSIS — I10 PRIMARY HYPERTENSION: ICD-10-CM

## 2024-04-10 DIAGNOSIS — Z00.00 ROUTINE GENERAL MEDICAL EXAMINATION AT A HEALTH CARE FACILITY: ICD-10-CM

## 2024-04-10 DIAGNOSIS — E78.2 MIXED HYPERLIPIDEMIA: ICD-10-CM

## 2024-04-10 DIAGNOSIS — Z78.0 POST-MENOPAUSAL: Primary | ICD-10-CM

## 2024-04-10 DIAGNOSIS — E03.9 ACQUIRED HYPOTHYROIDISM: ICD-10-CM

## 2024-04-10 NOTE — PROGRESS NOTES
Preventive Exam    History of Present Illness: Alice is here for check up and review of routine health maintenance. She states she is doing well and has no concerns.  We addressed the following chronic concerns:    Alice has chronic hypertension and has been well controlled on current medications.She  is monitored by me every 6 months and is here today for follow up. She is tolerating medications without side effect. She reports no vision changes, headaches or lightheadedness. She is requesting refills of medications.     Alice has chronic hypothyroidism and has been well controlled on current dose of replacement.She report no concerning symptoms: no cold intolerance, fatigue or hair loss.      She has chronic hyperlipidemia and his here today for regular 6 month monitoring of response to therapy. Lab work will be checked today. She is tolerating medications well without side effects.     She has chronic non seasonal allergies and is doing well with over-the-counter medication and azelastine nasal spray and Nasalide spray.       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     She has chronic anxiety and depression and is being seen by psychiatrist Elaina Penny.        Pap Smear:Hysterectomy  Mammogram:7/27/2023  Colon cancer screening:  STI screening:NI  Tobacco use :Many years ago  Bone Density:Ordered      REVIEW OF SYSTEMS  Constitutional: Negative.    HENT: Negative.    Eyes: Negative.    Respiratory: Negative.    Cardiovascular: Negative.    Gastrointestinal: Negative.    Endocrine: Negative.    Genitourinary: Negative.    Musculoskeletal: Negative.  Skin: Negative.    Allergic/Immunologic: Negative.    Neurological: Negative.    Hematological: Negative.    Psychiatric/Behavioral: Negative.    I have reviewed the ROS as documented by the MA. Paul Lau MD       PHYSICAL EXAM    Vitals:    04/10/24 1026  "  BP: 140/72   Pulse: 75   Resp: 16   SpO2: 97%   Weight: 97.1 kg (214 lb)   Height: 170.2 cm (67\")     GENERAL: alert and oriented, afebrile and vital signs stable  HEENT: oral mucosa moist, PEERLA, EOM, conjunctiva normal  No cervical adenopathy  LUNGS: clear to ascultation bilaterally, no rales, ronchi or wheezing  HEART: RRR S1 S2 without murmers, thrills, rubs or gallops  CHEST WALL: within normal limits, no tenderness  ABDOMEN: WNL. Normal BS.  EXTREMITIES: No clubbing, cyanosis or edema noted. Normal Pulses.  SKIN: warm, dry, no rashes noted  NEURO: CN II- XII grossly intact  PSYCH: Good mood and positive affect  ASSESSMENT AND PLAN  Problem List Items Addressed This Visit          Cardiac and Vasculature    Primary hypertension    Overview     Well controlled on current medication, will refill medication today and as needed. She will RTO for repeat B/P check in 6 months.Amlodipine 5 mg and Valsartan HCTZ 320/25 mg a day.         Mixed hyperlipidemia    Overview     She has been well controlled on Crestor 10 mg daily and was advised to continue a healthy low fat diet, include regular exercise and take medications as prescribed.She  will RTO in 6 months for regularly scheduled follow up to evaluate response to therapy and make medication adjustments as needed.          Relevant Orders    Lipid Panel With LDL / HDL Ratio       Endocrine and Metabolic    Hypothyroidism    Overview     Will check lipid panel today and She was advised to continue a healthy low fat diet, include regular exercise and take medications as prescribed.Patient will RTO in 6 months for regularly scheduled follow up to evaluate response to therapy and make medication adjustments as needed. Levothyroxine 25 mcg a day.         Relevant Orders    TSH     Other Visit Diagnoses       Post-menopausal    -  Primary    Relevant Orders    DEXA Bone Density Axial    Routine general medical examination at a health care facility        Relevant Orders "    Comprehensive Metabolic Panel    CBC (No Diff)          Routine health maintenance reviewed and discussed with Alice.        Orders Placed This Encounter   Procedures    DEXA Bone Density Axial    Lipid Panel With LDL / HDL Ratio    Comprehensive Metabolic Panel    CBC (No Diff)    TSH      No follow-ups on file.

## 2024-04-11 LAB
ALBUMIN SERPL-MCNC: 4.5 G/DL (ref 3.5–5.2)
ALBUMIN/GLOB SERPL: 2.1 G/DL
ALP SERPL-CCNC: 80 U/L (ref 39–117)
ALT SERPL-CCNC: 13 U/L (ref 1–33)
AST SERPL-CCNC: 15 U/L (ref 1–32)
BILIRUB SERPL-MCNC: 0.6 MG/DL (ref 0–1.2)
BUN SERPL-MCNC: 9 MG/DL (ref 8–23)
BUN/CREAT SERPL: 13.2 (ref 7–25)
CALCIUM SERPL-MCNC: 9.7 MG/DL (ref 8.6–10.5)
CHLORIDE SERPL-SCNC: 99 MMOL/L (ref 98–107)
CHOLEST SERPL-MCNC: 163 MG/DL (ref 0–200)
CO2 SERPL-SCNC: 28.9 MMOL/L (ref 22–29)
CREAT SERPL-MCNC: 0.68 MG/DL (ref 0.57–1)
EGFRCR SERPLBLD CKD-EPI 2021: 97.4 ML/MIN/1.73
ERYTHROCYTE [DISTWIDTH] IN BLOOD BY AUTOMATED COUNT: 13.1 % (ref 12.3–15.4)
GLOBULIN SER CALC-MCNC: 2.1 GM/DL
GLUCOSE SERPL-MCNC: 96 MG/DL (ref 65–99)
HCT VFR BLD AUTO: 41.4 % (ref 34–46.6)
HDLC SERPL-MCNC: 68 MG/DL (ref 40–60)
HGB BLD-MCNC: 12.9 G/DL (ref 12–15.9)
LDLC SERPL CALC-MCNC: 78 MG/DL (ref 0–100)
LDLC/HDLC SERPL: 1.13 {RATIO}
MCH RBC QN AUTO: 26.4 PG (ref 26.6–33)
MCHC RBC AUTO-ENTMCNC: 31.2 G/DL (ref 31.5–35.7)
MCV RBC AUTO: 84.7 FL (ref 79–97)
PLATELET # BLD AUTO: 339 10*3/MM3 (ref 140–450)
POTASSIUM SERPL-SCNC: 3.7 MMOL/L (ref 3.5–5.2)
PROT SERPL-MCNC: 6.6 G/DL (ref 6–8.5)
RBC # BLD AUTO: 4.89 10*6/MM3 (ref 3.77–5.28)
SODIUM SERPL-SCNC: 138 MMOL/L (ref 136–145)
TRIGL SERPL-MCNC: 90 MG/DL (ref 0–150)
TSH SERPL DL<=0.005 MIU/L-ACNC: 0.81 UIU/ML (ref 0.27–4.2)
VLDLC SERPL CALC-MCNC: 17 MG/DL (ref 5–40)
WBC # BLD AUTO: 6.34 10*3/MM3 (ref 3.4–10.8)

## 2024-04-11 NOTE — PATIENT INSTRUCTIONS
MyPlate from USDA    MyPlate is an outline of a general healthy diet based on the Dietary Guidelines for Americans, 8838-3659, from the U.S. Department of Agriculture (USDA). It sets guidelines for how much food you should eat from each food group based on your age, sex, and level of physical activity.  What are tips for following MyPlate?  To follow MyPlate recommendations:  Eat a wide variety of fruits and vegetables, grains, and protein foods.  Serve smaller portions and eat less food throughout the day.  Limit portion sizes to avoid overeating.  Enjoy your food.  Get at least 150 minutes of exercise every week. This is about 30 minutes each day, 5 or more days per week.  It can be difficult to have every meal look like MyPlate. Think about MyPlate as eating guidelines for an entire day, rather than each individual meal.  Fruits and vegetables  Make one half of your plate fruits and vegetables.  Eat many different colors of fruits and vegetables each day.  For a 2,000-calorie daily food plan, eat:  2½ cups of vegetables every day.  2 cups of fruit every day.  1 cup is equal to:  1 cup raw or cooked vegetables.  1 cup raw fruit.  1 medium-sized orange, apple, or banana.  1 cup 100% fruit or vegetable juice.  2 cups raw leafy greens, such as lettuce, spinach, or kale.  ½ cup dried fruit.  Grains  One fourth of your plate should be grains.  Make at least half of the grains you eat each day whole grains.  For a 2,000-calorie daily food plan, eat 6 oz of grains every day.  1 oz is equal to:  1 slice bread.  1 cup cereal.  ½ cup cooked rice, cereal, or pasta.  Protein  One fourth of your plate should be protein.  Eat a wide variety of protein foods, including meat, poultry, fish, eggs, beans, nuts, and tofu.  For a 2,000-calorie daily food plan, eat 5½ oz of protein every day.  1 oz is equal to:  1 oz meat, poultry, or fish.  ¼ cup cooked beans.  1 egg.  ½ oz nuts or seeds.  1 Tbsp peanut butter.  Dairy  Drink fat-free  or low-fat (1%) milk.  Eat or drink dairy as a side to meals.  For a 2,000-calorie daily food plan, eat or drink 3 cups of dairy every day.  1 cup is equal to:  1 cup milk, yogurt, cottage cheese, or soy milk (soy beverage).  2 oz processed cheese.  1½ oz natural cheese.  Fats, oils, salt, and sugars  Only small amounts of oils are recommended.  Avoid foods that are high in calories and low in nutritional value (empty calories), like foods high in fat or added sugars.  Choose foods that are low in salt (sodium). Choose foods that have less than 140 milligrams (mg) of sodium per serving.  Drink water instead of sugary drinks. Drink enough fluid to keep your urine pale yellow.  Where to find support  Work with your health care provider or a dietitian to develop a customized eating plan that is right for you.  Download an shira (mobile application) to help you track your daily food intake.  Where to find more information  USDA: ChooseMyPlate.gov  Summary  MyPlate is a general guideline for healthy eating from the USDA. It is based on the Dietary Guidelines for Americans, 4316-9150.  In general, fruits and vegetables should take up one half of your plate, grains should take up one fourth of your plate, and protein should take up one fourth of your plate.  This information is not intended to replace advice given to you by your health care provider. Make sure you discuss any questions you have with your health care provider.  Document Revised: 2021 Document Reviewed: 2021  [employer] Patient Education ©  [employer]    Your Annual Physical  Personal Prevention Plan      Date of Office Visit:    Encounter Provider:  Paul Lau MD  Place of Service:  [facility] PRIMARY [facility]ARE  Patient Name: Alice Akins  :  1960    As part of the Annual Physical portion of your visit today, we are providing you with this personalized preventive plan of services (PPPS). This plan is based upon  recommendations of the [place] Preventive Services Task Force (USPSTF) and the Advisory Committee on Immunization Practices (ACIP).    This lists the preventive care services that should be considered, and provides dates of when you are due. Items listed as completed are up-to-date and do not require any further intervention.    Health Maintenance   Topic Date Due    RSV Vaccine - Adults (1 - 1-dose 60+ series) Never done    COVID-19 Vaccine (5 - 2023-24 season) 04/12/2024 (Originally 9/1/2023)    INFLUENZA VACCINE  08/01/2024    DXA SCAN  09/23/2024    LIPID PANEL  10/05/2024    ANNUAL PHYSICAL  04/10/2025    BMI FOLLOWUP  04/10/2025    MAMMOGRAM  09/08/2025    TDAP/TD VACCINES (4 - Td or Tdap) 03/29/2032    COLORECTAL CANCER SCREENING  03/26/2034    HEPATITIS C SCREENING  Completed    ZOSTER VACCINE  Completed    Pneumococcal Vaccine 0-64  Aged Out    PAP SMEAR  Discontinued       Orders Placed This Encounter   Procedures    DEXA Bone Density Axial     Order Specific Question:   Is patient taking or have taken long term Glucocorticoid (steroids)?     Answer:   No     Order Specific Question:   Does the patient have rheumatoid arthritis?     Answer:   No     Order Specific Question:   Does the patient have secondary osteoporosis?     Answer:   No     Order Specific Question:   Reason for Exam:     Answer:   Post menopausal     Order Specific Question:   Release to patient     Answer:   Routine Release [[id number]]    Lipid Panel With LDL / HDL Ratio     Order Specific Question:   Release to patient     Answer:   Routine Release [[id number]]    Comprehensive Metabolic Panel     Order Specific Question:   Release to patient     Answer:   Routine Release [[id number]]    CBC (No Diff)     Order Specific Question:   Release to patient     Answer:   Routine Release [[id number]]    TSH     Order Specific Question:   Release to patient     Answer:   Routine Release [[id number]]       Return in about 6 months (around  10/10/2024) for Recheck.

## 2024-04-15 RX ORDER — BUSPIRONE HYDROCHLORIDE 30 MG/1
30 TABLET ORAL DAILY
Qty: 90 TABLET | Refills: 0 | Status: SHIPPED | OUTPATIENT
Start: 2024-04-15

## 2024-04-17 DIAGNOSIS — I10 PRIMARY HYPERTENSION: ICD-10-CM

## 2024-04-17 RX ORDER — AMLODIPINE BESYLATE 5 MG/1
5 TABLET ORAL DAILY
Qty: 90 TABLET | Refills: 0 | Status: SHIPPED | OUTPATIENT
Start: 2024-04-17

## 2024-04-17 RX ORDER — VALSARTAN AND HYDROCHLOROTHIAZIDE 320; 25 MG/1; MG/1
1 TABLET, FILM COATED ORAL DAILY
Qty: 90 TABLET | Refills: 0 | Status: SHIPPED | OUTPATIENT
Start: 2024-04-17 | End: 2025-04-17

## 2024-04-17 RX ORDER — ROSUVASTATIN CALCIUM 10 MG/1
10 TABLET, COATED ORAL DAILY
Qty: 90 TABLET | Refills: 0 | Status: SHIPPED | OUTPATIENT
Start: 2024-04-17

## 2024-06-13 ENCOUNTER — OFFICE VISIT (OUTPATIENT)
Dept: NEUROLOGY | Facility: CLINIC | Age: 64
End: 2024-06-13
Payer: COMMERCIAL

## 2024-06-13 ENCOUNTER — LAB (OUTPATIENT)
Dept: LAB | Facility: HOSPITAL | Age: 64
End: 2024-06-13
Payer: COMMERCIAL

## 2024-06-13 VITALS
BODY MASS INDEX: 34.53 KG/M2 | HEIGHT: 67 IN | WEIGHT: 220 LBS | HEART RATE: 74 BPM | DIASTOLIC BLOOD PRESSURE: 82 MMHG | SYSTOLIC BLOOD PRESSURE: 136 MMHG

## 2024-06-13 DIAGNOSIS — G89.29 CHRONIC CHEST PAIN WITH HIGH RISK FOR CAD: Primary | ICD-10-CM

## 2024-06-13 DIAGNOSIS — I20.0 UNSTABLE ANGINA: ICD-10-CM

## 2024-06-13 DIAGNOSIS — R07.9 CHRONIC CHEST PAIN WITH HIGH RISK FOR CAD: Primary | ICD-10-CM

## 2024-06-13 DIAGNOSIS — R93.1 HIGH CORONARY ARTERY CALCIUM SCORE: ICD-10-CM

## 2024-06-13 DIAGNOSIS — Z91.89 CHRONIC CHEST PAIN WITH HIGH RISK FOR CAD: Primary | ICD-10-CM

## 2024-06-13 DIAGNOSIS — E53.8 VITAMIN B12 DEFICIENCY: Primary | ICD-10-CM

## 2024-06-13 DIAGNOSIS — G60.9 IDIOPATHIC PERIPHERAL NEUROPATHY: ICD-10-CM

## 2024-06-13 LAB — VIT B12 BLD-MCNC: 968 PG/ML (ref 211–946)

## 2024-06-13 PROCEDURE — 82607 VITAMIN B-12: CPT | Performed by: NURSE PRACTITIONER

## 2024-06-13 PROCEDURE — 36415 COLL VENOUS BLD VENIPUNCTURE: CPT | Performed by: NURSE PRACTITIONER

## 2024-06-13 NOTE — PROGRESS NOTES
[facility] NEUROLOGY         Date of Visit: 2024    Name: Alice Akins    :  1960    PCP: Paul Lau MD    Visit Type: an initial evaluation         Subjective     Patient ID: Alice is a 64 y.o. female.         History of Present Illness  I had the pleasure of seeing your patient today.  As you may know she is a 63-year-old female here today for follow-up for complaints of multiple falls, balance difficulty, and numbness and tingling of the lower extremities and hands.  She was referred by her primary care physician.    History:    Patient does have medical history of hypertension and hypothyroid.    Patient intially presented to our office in 2023 for falls and numbness of feet.  See office note from that date for more detailed historical information.    In November patient was started on B12 injections for vitamin B12 deficiency in the .  She also had an MRI of the brain which came back within normal limits.  Patient did end up undergoing EMG study in January which revealed evidence for peripheral neuropathy with no radiculopathies or other abnormalities.  She also went thorough laboratory workup for reversible causes for neuropathy symptoms with no other additional abnormalities.    Current:    Patient states that overall she continues to have some balance healthy as well as decreased sensation in the feet.  She denies any pain or paresthesia symptoms.  She does report occasional tingling in the hands.  She does state that memory symptoms seem to have improved since being on the B12.  She did complete her B12 injections a month ago and is due for lab follow-up.  She denies any recent falls.  She did have 1 episode of a feeling of weakness in her legs after standing and walking a brief period.  She states that episode was less than a minute or 2 in duration and she has not had any additional episodes.  She does also report an episode that happened yesterday  where she had profuse sweating followed by nausea and vomiting which lasted about 2 hours and then resolved.  She denied any other symptoms with the episode.  She did go for physical therapy evaluation however was discharged after a single appointment as patient's overall balance and coordination symptoms were fairly intact and they did not feel that she would benefit from additional therapy at this time.  Otherwise no other new complaints at today's visit.      The following portions of the patient's history were reviewed and updated as appropriate: allergies, current medications, past family history, past medical history, past social history, past surgical history, and problem list.                 Review of Systems   Constitutional:  Negative for activity change, appetite change, fatigue and unexpected weight change.   HENT:  Negative for tinnitus and trouble swallowing.    Eyes:  Negative for visual disturbance.   Respiratory:  Negative for chest tightness and shortness of breath.    Gastrointestinal:  Negative for constipation, diarrhea, nausea and vomiting.   Genitourinary:  Negative for difficulty urinating, frequency and urgency.   Musculoskeletal:  Positive for back pain and gait problem. Negative for neck pain.   Neurological:  Positive for numbness. Negative for dizziness, tremors, seizures, syncope, facial asymmetry, speech difficulty, weakness, light-headedness and headaches.   Psychiatric/Behavioral:  Negative for confusion and sleep disturbance.             Current Medications:    Current Outpatient Medications   Medication Instructions    amLODIPine (NORVASC) 5 mg, Oral, Daily    busPIRone (BUSPAR) 30 mg, Oral, Daily    Cetirizine HCl (ZyrTEC Allergy) 10 MG capsule     Coenzyme Q10 (Co Q-10) 300 MG capsule Oral    Ginkgo Biloba 40 MG tablet 1 tablet, Oral, Daily    latanoprost (XALATAN) 0.005 % ophthalmic solution 1 drop, Both Eyes, Nightly    levothyroxine (SYNTHROID, LEVOTHROID) 25 mcg, Oral, Daily  "   rosuvastatin (CRESTOR) 10 mg, Oral, Daily    valACYclovir (VALTREX) 1,000 mg, Oral, 2 Times Daily    valsartan-hydrochlorothiazide (DIOVAN-HCT) 320-25 MG per tablet 1 tablet, Oral, Daily    venlafaxine XR (EFFEXOR-XR) 150 mg, Oral, Daily          /82   Pulse 74   Ht 170.2 cm (67.01\")   Wt 99.8 kg (220 lb)   BMI 34.45 kg/m²                Objective     Neurological Exam  Mental Status  Awake, alert and oriented to person, place and time. Speech is normal. Language is fluent with no aphasia.    Cranial Nerves  CN II: Visual fields full to confrontation.  CN III, IV, VI: Extraocular movements intact bilaterally. Normal lids and orbits bilaterally. Pupils equal round and reactive to light bilaterally.  CN V: Facial sensation is normal.  CN VII: Full and symmetric facial movement.  CN IX, X: Palate elevates symmetrically  CN XI: Shoulder shrug strength is normal.  CN XII: Tongue midline without atrophy or fasciculations.    Motor  Normal muscle bulk throughout. Normal muscle tone. No abnormal involuntary movements. Strength is 5/5 throughout all four extremities.    Sensory  Light touch abnormality: Decreased medial part of left shin normal elsewhere. Pinprick is normal in upper and lower extremities. Decreased cold to medial part of left shin normal elsewhere. Vibration abnormality: Decreased medial part of left shin normal elsewhere. Proprioception is normal in upper and lower extremities.     Reflexes  Deep tendon reflexes are 2+ and symmetric in all four extremities.    Coordination    Finger-to-nose, rapid alternating movements and heel-to-shin normal bilaterally without dysmetria.    Gait  Casual gait is normal including stance, stride, and arm swing. Toe walking abnormality: Heel walking abnormality: Tandem gait abnormality: Romberg is absent. Able to rise from chair without using arms.      Physical Exam  Constitutional:       Appearance: Normal appearance. She is normal weight.   Eyes:      General: " Lids are normal.      Extraocular Movements: Extraocular movements intact.      Pupils: Pupils are equal, round, and reactive to light.   Pulmonary:      Effort: Pulmonary effort is normal.   Skin:     General: Skin is warm.   Neurological:      Motor: Motor strength is normal.     Coordination: Coordination is intact. Romberg sign negative.      Deep Tendon Reflexes: Reflexes are normal and symmetric.   Psychiatric:         Mood and Affect: Mood normal.         Speech: Speech normal.         Behavior: Behavior normal.                     Assessment & Plan     Diagnoses and all orders for this visit:    1. Vitamin B12 deficiency (Primary)  -     Vitamin B12    2. Idiopathic peripheral neuropathy         At this time we will continue to monitor neuropathy symptoms.  We will go ahead and check B12 level again today.    I did recommend she follow-up with cardiology due to the episode she had yesterday.    Follow-up in 6 months or sooner if needed.            Suzi ALEJANDRE    Neurology    [facility]    Phone: [phone]

## 2024-06-14 ENCOUNTER — HOSPITAL ENCOUNTER (OUTPATIENT)
Dept: CARDIOLOGY | Facility: HOSPITAL | Age: 64
Discharge: HOME | End: 2024-06-14
Payer: COMMERCIAL

## 2024-06-14 VITALS — HEIGHT: 67 IN | BODY MASS INDEX: 34.53 KG/M2 | WEIGHT: 220.02 LBS

## 2024-06-14 DIAGNOSIS — I20.0 UNSTABLE ANGINA: ICD-10-CM

## 2024-06-14 DIAGNOSIS — R93.1 HIGH CORONARY ARTERY CALCIUM SCORE: ICD-10-CM

## 2024-06-14 DIAGNOSIS — Z91.89 CHRONIC CHEST PAIN WITH HIGH RISK FOR CAD: ICD-10-CM

## 2024-06-14 DIAGNOSIS — R07.9 CHRONIC CHEST PAIN WITH HIGH RISK FOR CAD: ICD-10-CM

## 2024-06-14 DIAGNOSIS — G89.29 CHRONIC CHEST PAIN WITH HIGH RISK FOR CAD: ICD-10-CM

## 2024-06-14 LAB
BH CV NUCLEAR PRIOR STUDY: 2
BH CV REST NUCLEAR ISOTOPE DOSE: 11.3 MCI
BH CV STRESS BP STAGE 1: NORMAL
BH CV STRESS BP STAGE 2: NORMAL
BH CV STRESS DURATION MIN STAGE 1: 3
BH CV STRESS DURATION MIN STAGE 2: 3
BH CV STRESS DURATION SEC STAGE 1: 0
BH CV STRESS DURATION SEC STAGE 2: 0
BH CV STRESS GRADE STAGE 1: 10
BH CV STRESS GRADE STAGE 2: 12
BH CV STRESS HR STAGE 1: 108
BH CV STRESS HR STAGE 2: 140
BH CV STRESS METS STAGE 1: 5
BH CV STRESS METS STAGE 2: 7.5
BH CV STRESS NUCLEAR ISOTOPE DOSE: 34.7 MCI
BH CV STRESS PROTOCOL 1: NORMAL
BH CV STRESS RECOVERY BP: NORMAL MMHG
BH CV STRESS RECOVERY HR: 85 BPM
BH CV STRESS SPEED STAGE 1: 1.7
BH CV STRESS SPEED STAGE 2: 2.5
BH CV STRESS STAGE 1: 1
BH CV STRESS STAGE 2: 2
LV EF NUC BP: 64 %
MAXIMAL PREDICTED HEART RATE: 156 BPM
PERCENT MAX PREDICTED HR: 89.74 %
STRESS BASELINE BP: NORMAL MMHG
STRESS BASELINE HR: 76 BPM
STRESS PERCENT HR: 106 %
STRESS POST ESTIMATED WORKLOAD: 7.5 METS
STRESS POST EXERCISE DUR MIN: 6 MIN
STRESS POST EXERCISE DUR SEC: 0 SEC
STRESS POST PEAK BP: NORMAL MMHG
STRESS POST PEAK HR: 140 BPM
STRESS TARGET HR: 133 BPM

## 2024-06-14 PROCEDURE — A9502 TC99M TETROFOSMIN: HCPCS | Performed by: STUDENT IN AN ORGANIZED HEALTH CARE EDUCATION/TRAINING PROGRAM

## 2024-06-14 PROCEDURE — 78452 HT MUSCLE IMAGE SPECT MULT: CPT

## 2024-06-14 PROCEDURE — 93017 CV STRESS TEST TRACING ONLY: CPT

## 2024-06-14 PROCEDURE — 0 TECHNETIUM TETROFOSMIN KIT: Performed by: STUDENT IN AN ORGANIZED HEALTH CARE EDUCATION/TRAINING PROGRAM

## 2024-06-14 RX ADMIN — TETROFOSMIN 1 DOSE: 1.38 INJECTION, POWDER, LYOPHILIZED, FOR SOLUTION INTRAVENOUS at 10:47

## 2024-06-14 RX ADMIN — TETROFOSMIN 1 DOSE: 1.38 INJECTION, POWDER, LYOPHILIZED, FOR SOLUTION INTRAVENOUS at 09:30

## 2024-06-14 NOTE — PROGRESS NOTES
"Please call patient and let her know that her stress test was normal.  This shows that the heart is getting all the perfusion it needs, there are no blocked heart vessels that would have caused the spell that occurred.  She likely had some sort of GI related issue, possibly a vasovagal episode.  The stress test shows that there are no significantly blocked heart vessels that would cut off any blood slow supply of the heart, or cause symptoms that she experienced.  This also shows that despite the \"coronary calcium\" that was noted on the scan that she had in [place], there is no evidence of any obstruction that would be causing her symptoms.  She can follow-up with me as scheduled in October.  Thank you for the help."

## 2024-07-23 DIAGNOSIS — I10 PRIMARY HYPERTENSION: ICD-10-CM

## 2024-07-23 RX ORDER — AMLODIPINE BESYLATE 5 MG/1
5 TABLET ORAL DAILY
Qty: 90 TABLET | Refills: 0 | OUTPATIENT
Start: 2024-07-23

## 2024-07-23 RX ORDER — ROSUVASTATIN CALCIUM 10 MG/1
10 TABLET, COATED ORAL DAILY
Qty: 90 TABLET | Refills: 0 | Status: SHIPPED | OUTPATIENT
Start: 2024-07-23

## 2024-07-23 RX ORDER — VALSARTAN AND HYDROCHLOROTHIAZIDE 320; 25 MG/1; MG/1
1 TABLET, FILM COATED ORAL DAILY
Qty: 90 TABLET | Refills: 0 | Status: SHIPPED | OUTPATIENT
Start: 2024-07-23 | End: 2025-07-23

## 2024-07-23 RX ORDER — AMLODIPINE BESYLATE 5 MG/1
5 TABLET ORAL DAILY
Qty: 90 TABLET | Refills: 0 | Status: SHIPPED | OUTPATIENT
Start: 2024-07-23

## 2024-08-21 RX ORDER — LEVOTHYROXINE SODIUM 0.03 MG/1
25 TABLET ORAL DAILY
Qty: 90 TABLET | Refills: 0 | Status: SHIPPED | OUTPATIENT
Start: 2024-08-21

## 2024-08-28 ENCOUNTER — TELEPHONE (OUTPATIENT)
Dept: CARDIOLOGY | Facility: CLINIC | Age: 64
End: 2024-08-28
Payer: COMMERCIAL

## 2024-09-17 ENCOUNTER — PATIENT MESSAGE (OUTPATIENT)
Dept: CARDIOLOGY | Facility: CLINIC | Age: 64
End: 2024-09-17
Payer: COMMERCIAL

## 2024-09-24 ENCOUNTER — HOSPITAL ENCOUNTER (OUTPATIENT)
Dept: BONE DENSITY | Facility: HOSPITAL | Age: 64
Discharge: HOME | End: 2024-09-24
Admitting: FAMILY MEDICINE
Payer: COMMERCIAL

## 2024-09-24 PROCEDURE — 77080 DXA BONE DENSITY AXIAL: CPT

## 2024-10-17 DIAGNOSIS — I10 PRIMARY HYPERTENSION: ICD-10-CM

## 2024-10-17 RX ORDER — ROSUVASTATIN CALCIUM 10 MG/1
10 TABLET, COATED ORAL DAILY
Qty: 30 TABLET | Refills: 0 | Status: SHIPPED | OUTPATIENT
Start: 2024-10-17

## 2024-10-17 RX ORDER — AMLODIPINE BESYLATE 5 MG/1
5 TABLET ORAL DAILY
Qty: 30 TABLET | Refills: 0 | Status: SHIPPED | OUTPATIENT
Start: 2024-10-17 | End: 2024-10-21 | Stop reason: SDUPTHER

## 2024-10-17 RX ORDER — VALSARTAN AND HYDROCHLOROTHIAZIDE 320; 25 MG/1; MG/1
1 TABLET, FILM COATED ORAL DAILY
Qty: 30 TABLET | Refills: 0 | Status: SHIPPED | OUTPATIENT
Start: 2024-10-17

## 2024-10-21 ENCOUNTER — OFFICE VISIT (OUTPATIENT)
Dept: INTERNAL MEDICINE | Facility: CLINIC | Age: 64
End: 2024-10-21
Payer: COMMERCIAL

## 2024-10-21 VITALS
DIASTOLIC BLOOD PRESSURE: 80 MMHG | OXYGEN SATURATION: 97 % | HEIGHT: 67 IN | WEIGHT: 229 LBS | HEART RATE: 71 BPM | SYSTOLIC BLOOD PRESSURE: 145 MMHG | TEMPERATURE: 98.2 F | BODY MASS INDEX: 35.94 KG/M2

## 2024-10-21 DIAGNOSIS — F32.A ANXIETY AND DEPRESSION: Chronic | ICD-10-CM

## 2024-10-21 DIAGNOSIS — E03.9 HYPOTHYROIDISM, UNSPECIFIED TYPE: Chronic | ICD-10-CM

## 2024-10-21 DIAGNOSIS — M21.611 BUNION OF RIGHT FOOT: ICD-10-CM

## 2024-10-21 DIAGNOSIS — I10 PRIMARY HYPERTENSION: Chronic | ICD-10-CM

## 2024-10-21 DIAGNOSIS — Z76.89 ENCOUNTER TO ESTABLISH CARE WITH NEW DOCTOR: Primary | ICD-10-CM

## 2024-10-21 DIAGNOSIS — F41.9 ANXIETY AND DEPRESSION: Chronic | ICD-10-CM

## 2024-10-21 DIAGNOSIS — E78.2 MIXED HYPERLIPIDEMIA: Chronic | ICD-10-CM

## 2024-10-21 PROBLEM — F32.9 MAJOR DEPRESSIVE DISORDER, SINGLE EPISODE, UNSPECIFIED: Status: RESOLVED | Noted: 2024-03-13 | Resolved: 2024-10-21

## 2024-10-21 PROCEDURE — 99214 OFFICE O/P EST MOD 30 MIN: CPT | Performed by: STUDENT IN AN ORGANIZED HEALTH CARE EDUCATION/TRAINING PROGRAM

## 2024-10-21 RX ORDER — CLINDAMYCIN HCL 300 MG
CAPSULE ORAL
COMMUNITY

## 2024-10-21 RX ORDER — AMLODIPINE BESYLATE 10 MG/1
10 TABLET ORAL DAILY
Qty: 90 TABLET | Refills: 1 | Status: SHIPPED | OUTPATIENT
Start: 2024-10-21 | End: 2025-04-19

## 2024-10-21 RX ORDER — NAPROXEN SODIUM 220 MG
TABLET ORAL
COMMUNITY

## 2024-10-21 RX ORDER — CHOLECALCIFEROL (VITAMIN D3) 50 MCG
CAPSULE ORAL
COMMUNITY
Start: 2024-09-23

## 2024-10-21 NOTE — PATIENT INSTRUCTIONS
I would recommend over the counter acetaminophen 1000mg every 8 hours as needed or ibuprofen 600mg every 6 hours as needed for pain.

## 2024-10-21 NOTE — PROGRESS NOTES
"Chief Complaint  Establish care, HTN, R foot pain, anxiety/depression    Subjective        Alice Aknis presents to clinic today to establish care with a new provider. Patient was previously following with Dr. Lau.    HTN: SBP elevated x2 today. Patient compliant with current antihypertensive medication regimen. She states that she takes her BP occasionally outside the office and readings are typically similar to the ones in the office today. No complaints of CP, shortness of breath, swelling in the legs/feet.     R foot pain/bunion Chronic issue for this patient. She has an obvious bunion on medial aspect of R foot and some outward deviation of all toes on the right foot. States that she has had several foot surgeries in the past on the left for similar problems. States that she has an appointment with  orthopedics in November 2024 and hopeful that she can get approved for surgical correction on R foot as well.     Anxiety/depression: Follows with outpatient psychiatry. States that mood is generally well controlled with current regimen of lexapro and buspar daily.     Objective   Vital Signs:  /80   Pulse 71   Temp 98.2 °F (36.8 °C)   Ht 170 cm (66.93\")   Wt 104 kg (229 lb)   SpO2 97%   BMI 35.94 kg/m²   Estimated body mass index is 35.94 kg/m² as calculated from the following:    Height as of this encounter: 170 cm (66.93\").    Weight as of this encounter: 104 kg (229 lb).    Physical Exam  Constitutional:       General: She is not in acute distress.     Appearance: Normal appearance.   Cardiovascular:      Rate and Rhythm: Normal rate and regular rhythm.   Pulmonary:      Effort: Pulmonary effort is normal. No respiratory distress.   Musculoskeletal:         General: No swelling or deformity. Normal range of motion.      Right foot: Bunion present.   Feet:      Comments: Outward deviation of all toes on R  Skin:     General: Skin is warm and dry.   Neurological:      General: No focal deficit " present.      Mental Status: She is oriented to person, place, and time. Mental status is at baseline.   Psychiatric:         Mood and Affect: Mood normal.         Behavior: Behavior normal.        Result Review :  The following data was reviewed by: Cheryl Monet MD on 10/21/2024:  CMP          3/13/2024    12:13 4/10/2024    11:15   CMP   Glucose  96    BUN  9    Creatinine  0.68    Sodium  138    Potassium  3.7    Chloride  99    Calcium  9.7    Total Protein 6.6  6.6    Albumin 3.7  4.5    Globulin 2.9  2.1    Total Bilirubin  0.6    Alkaline Phosphatase  80    AST (SGOT)  15    ALT (SGPT)  13    BUN/Creatinine Ratio  13.2      CBC          4/10/2024    11:15   CBC   WBC 6.34    RBC 4.89    Hemoglobin 12.9    Hematocrit 41.4    MCV 84.7    MCH 26.4    MCHC 31.2    RDW 13.1    Platelets 339      Lipid Panel          4/10/2024    11:15   Lipid Panel   Total Cholesterol 163    Triglycerides 90    HDL Cholesterol 68    VLDL Cholesterol 17    LDL Cholesterol  78    LDL/HDL Ratio 1.13      TSH          4/10/2024    11:15   TSH   TSH 0.805      BMP          4/10/2024    11:15   BMP   BUN 9    Creatinine 0.68    Sodium 138    Potassium 3.7    Chloride 99    CO2 28.9    Calcium 9.7      Data reviewed : Consultant notes Neurology note 6/13/2024, cardiology note 10/19/2024      Current Outpatient Medications:     busPIRone (BUSPAR) 30 MG tablet, TAKE 1 TABLET BY MOUTH DAILY, Disp: 90 tablet, Rfl: 0    Cetirizine HCl (ZyrTEC Allergy) 10 MG capsule, , Disp: , Rfl:     Cholecalciferol (D3 High Potency) 250 MCG (06217 UT) capsule, , Disp: , Rfl:     clindamycin (CLEOCIN) 300 MG capsule, , Disp: , Rfl:     Coenzyme Q10 (Co Q-10) 300 MG capsule, Take  by mouth., Disp: , Rfl:     Ginkgo Biloba 40 MG tablet, Take 1 tablet by mouth daily., Disp: , Rfl:     latanoprost (XALATAN) 0.005 % ophthalmic solution, Administer 1 drop to both eyes Every Night., Disp: , Rfl:     levothyroxine (SYNTHROID, LEVOTHROID) 25 MCG tablet, TAKE 1  TABLET BY MOUTH DAILY, Disp: 90 tablet, Rfl: 0    naproxen sodium (Aleve) 220 MG tablet, , Disp: , Rfl:     rosuvastatin (CRESTOR) 10 MG tablet, TAKE 1 TABLET BY MOUTH DAILY, Disp: 30 tablet, Rfl: 0    valACYclovir (VALTREX) 1000 MG tablet, Take 1 tablet by mouth 2 (Two) Times a Day., Disp: , Rfl:     valsartan-hydrochlorothiazide (DIOVAN-HCT) 320-25 MG per tablet, TAKE 1 TABLET BY MOUTH ONCE DAILY, Disp: 30 tablet, Rfl: 0    venlafaxine XR (EFFEXOR-XR) 150 MG 24 hr capsule, Take 1 capsule by mouth Daily., Disp: 90 capsule, Rfl: 1    amLODIPine (NORVASC) 10 MG tablet, Take 1 tablet by mouth Daily for 180 days., Disp: 90 tablet, Rfl: 1         Assessment and Plan   Diagnoses and all orders for this visit:    1. Encounter to establish care with new doctor (Primary)    2. Primary hypertension  Assessment & Plan:  Not controlled   Continue valsart-HCTZ daily, increase amlodipine dose to 10mg daily    Orders:  -     Comprehensive Metabolic Panel  -     CBC & Differential  -     amLODIPine (NORVASC) 10 MG tablet; Take 1 tablet by mouth Daily for 180 days.  Dispense: 90 tablet; Refill: 1    3. Mixed hyperlipidemia  Assessment & Plan:  Continue Crestor 10mg daily     Orders:  -     Lipid Panel With LDL / HDL Ratio  -     Hemoglobin A1c    4. Anxiety and depression  Assessment & Plan:  Well controlled  Follows with outpatient psychiatry  Continue lexapro and buspar daily      5. Hypothyroidism, unspecified type  Assessment & Plan:  No symptoms of hypo-/hyperthyroid reported  Continue synthroid 25mcg daily    Orders:  -     CBC & Differential  -     TSH Rfx On Abnormal To Free T4    6. BMI 35.0-35.9,adult  -     Lipid Panel With LDL / HDL Ratio  -     Hemoglobin A1c    7. Bunion of right foot  Assessment & Plan:  Has appointment with  orthopedics in 11/2024             Follow Up   Return in about 6 months (around 4/21/2025) for Annual physical and lab work.    Patient was given instructions and counseling regarding her  condition or for health maintenance advice. Please see specific information pulled into the AVS if appropriate.     Cheryl Monet MD

## 2024-10-23 ENCOUNTER — PATIENT ROUNDING (BHMG ONLY) (OUTPATIENT)
Dept: INTERNAL MEDICINE | Facility: CLINIC | Age: 64
End: 2024-10-23
Payer: COMMERCIAL

## 2024-10-24 ENCOUNTER — OFFICE VISIT (OUTPATIENT)
Dept: CARDIOLOGY | Facility: CLINIC | Age: 64
End: 2024-10-24
Payer: COMMERCIAL

## 2024-10-24 VITALS
DIASTOLIC BLOOD PRESSURE: 80 MMHG | WEIGHT: 229 LBS | HEART RATE: 68 BPM | SYSTOLIC BLOOD PRESSURE: 114 MMHG | HEIGHT: 67 IN | BODY MASS INDEX: 35.94 KG/M2

## 2024-10-24 DIAGNOSIS — R93.1 ELEVATED CORONARY ARTERY CALCIUM SCORE: ICD-10-CM

## 2024-10-24 DIAGNOSIS — I44.4 LAFB (LEFT ANTERIOR FASCICULAR BLOCK): ICD-10-CM

## 2024-10-24 DIAGNOSIS — R55 VASO VAGAL EPISODE: Primary | ICD-10-CM

## 2024-10-24 DIAGNOSIS — I10 PRIMARY HYPERTENSION: ICD-10-CM

## 2024-10-24 DIAGNOSIS — R94.31 ABNORMAL EKG: ICD-10-CM

## 2024-10-24 PROCEDURE — 99214 OFFICE O/P EST MOD 30 MIN: CPT | Performed by: STUDENT IN AN ORGANIZED HEALTH CARE EDUCATION/TRAINING PROGRAM

## 2024-10-24 PROCEDURE — 93000 ELECTROCARDIOGRAM COMPLETE: CPT | Performed by: STUDENT IN AN ORGANIZED HEALTH CARE EDUCATION/TRAINING PROGRAM

## 2024-10-24 NOTE — PROGRESS NOTES
"      [facility]    Subjective:     Encounter Date:10/24/24      Patient ID: Alice Akins is a 64 y.o. female.    Chief Complaint:   Chief Complaint   Patient presents with    Hypertension      History of Present Illness    Ms. Akins is a pleasant 64 y.o. lady past medical history hypertension, hyperlipidemia, prior tobacco abuse who presents to establish care after preoperative cardiac testing revealed an abnormal EKG.        Since last visit, she has done well.  She did have a acute diaphoresis, nausea episode that occurred over the summer.  She had significant emesis and and was profoundly diaphoretic.  Some pressure in her chest when she was vomiting.  She had not undergone cardiac testing, and she was arranged for a stress test shortly thereafter.  Thankfully, it was unremarkable.  Likely food poisoning episode.    She otherwise has no new complaints today.  She is scheduled for podiatry surgery soon.     She reports she has seen cardiologist previously due to abnormal EKGs.  It was first noted in 2004 before hysterectomy, and she had normal stress test at that time.  She has since then had numerous normal stress test due to EKGs to keep revealing \"inferior infarct pattern.\"  On further review, findings could be consistent with left anterior fascicular block.     She underwent a coronary calcium score in 2018 at [facility].  Is able to directly review those results.     She states she quit smoking 20 years ago.  She otherwise denies active cardiac complaints.  And reports compliance with her rosuvastatin 10.  She has not taken daily aspirin.    Since her last visit, she underwent a TKA and tolerated well.  No cardiac complications.  She has no shortness of breath or lightheadedness at this time.  She does have some gait instability and is being worked up by neurology for this.  She denies any syncope or presyncope and states that she just loses her balance but has no other " "constitutional symptoms when she trips.     Prior card testing:  Coronary calcium score December 2018, [facility]  Left Main: 0  LAD: 11.3  Circumflex: 0  RCA: 0    Ascending thoracic aorta 3.6 cm.    The following portions of the patient's history were reviewed and updated as appropriate: allergies, current medications, past family history, past medical history, past social history, past surgical history and problem list.    Past Medical History:   Diagnosis Date    Abnormal ECG     Allergic Ongoing    Seasonal    Allergic rhinitis     Anxiety 2017    Medication    Arthritis 2004    Cataract 03/03/2023    Eye  keeps check    Colon polyp 2013    COVID     Depression     GERD (gastroesophageal reflux disease)     Glaucoma     Heart murmur     Hypertension     Hypothyroidism     Infectious viral hepatitis 1990s    Hepatitis B    Joint pain     Mixed hyperlipidemia 01/20/2022    Obesity     Osteoporosis     Thumb pain     Wrist pain        Past Surgical History:   Procedure Laterality Date    COLONOSCOPY      COSMETIC SURGERY  1990s    Remove birthmark    FOOT SURGERY Bilateral     HYSTERECTOMY      JOINT REPLACEMENT      Lt TKR    KNEE ARTHROSCOPY Left     OOPHORECTOMY             ECG 12 Lead    Date/Time: 10/24/2024 9:53 AM  Performed by: Paolo Mendez MD    Authorized by: Paolo Mendez MD  Comparison: compared with previous ECG from 10/19/2023  Similar to previous ECG  Rhythm: sinus rhythm  Rate: normal  Conduction: incomplete right bundle branch block and left anterior fascicular block  QRS axis: left  Other findings: early transition    Clinical impression: abnormal EKG             Objective:     Vitals:    10/24/24 0929   BP: 114/80   Pulse: 68   Weight: 104 kg (229 lb)   Height: 170 cm (66.93\")         Constitutional:       Appearance: Healthy appearance. Not in distress.   Neck:      Vascular: JVD normal.   Pulmonary:      Effort: Pulmonary effort is normal.      Breath sounds: Normal breath " sounds.   Cardiovascular:      PMI at left midclavicular line. Normal rate. Regular rhythm. Normal S2.       Murmurs: There is no murmur.   Pulses:     Intact distal pulses.   Edema:     Peripheral edema absent.   Skin:     General: Skin is warm and dry.   Neurological:      General: No focal deficit present.      Mental Status: Alert, oriented to person, place, and time and oriented to person, place and time.   Psychiatric:         Mood and Affect: Mood and affect normal.         Lab Review:     Lipid Panel          4/10/2024    11:15   Lipid Panel   Total Cholesterol 163    Triglycerides 90    HDL Cholesterol 68    VLDL Cholesterol 17    LDL Cholesterol  78    LDL/HDL Ratio 1.13      BUN   Date Value Ref Range Status   04/10/2024 9 8 - 23 mg/dL Final   10/05/2022 9 8 - 23 mg/dL Final   01/06/2018 16 6 - 25 mg/dL Final     Creatinine   Date Value Ref Range Status   04/10/2024 0.68 0.57 - 1.00 mg/dL Final   10/05/2022 0.65 0.57 - 1.00 mg/dL Final   01/06/2018 0.67 0.40 - 1.00 mg/dL Final     Comment:     Note new normal range.  IDMS-traceable method     Potassium   Date Value Ref Range Status   04/10/2024 3.7 3.5 - 5.2 mmol/L Final   10/05/2022 3.8 3.5 - 5.2 mmol/L Final   01/06/2018 3.9 3.5 - 5.1 mmol/L Final     ALT (SGPT)   Date Value Ref Range Status   04/10/2024 13 1 - 33 U/L Final   10/05/2022 18 1 - 33 U/L Final   01/06/2018 26 0 - 60 U/L Final     Comment:     Note new normal range.     AST (SGOT)   Date Value Ref Range Status   04/10/2024 15 1 - 32 U/L Final   10/05/2022 24 1 - 32 U/L Final   01/06/2018 19 0 - 37 U/L Final         Performed        Assessment:          Diagnosis Plan   1. Vaso vagal episode  ECG 12 Lead      2. LAFB (left anterior fascicular block)  ECG 12 Lead      3. Abnormal EKG        4. Primary hypertension        5. Elevated coronary artery calcium score                 Plan:         Abnormal EKG  Left anterior fascicular block with incomplete right bundle  Stable, chronic.  No  clinical signs of CHF.  Recent Myocard perfusion stress test June 2024 normal, no evidence of ischemia.  Find RVH to be less likely, I did encourage her to monitor for signs and symptoms of sleep apnea.  If she has any daytime hypersomnolence, nocturnal snoring or apneic episodes she may benefit from sleep apnea testing.  No findings of CHF on exam.  Coronary artery calcium.  Mild plaque score, Agatston score 12 in 2018.  CAC level does not require aspirin therapy at this time  Hyperlipidemia  Continue moderate density statin therapy given the coronary artery calcifications.  Preop risk evaluation prior to podiatry surgery.  She is low risk for preoperative complications.  She is not on antiplatelets.  There is a clearance letter in her chart for wherever she chooses to undergo podiatry surgery.  Hypertension.  Controlled on current regimen.  Her PCP recently increased her amlodipine to 10       RTC 1 year for risk factor check-in.    Paolo Mendez MD  [facility]  10/24/24  09:19 EDT       Current Outpatient Medications:     amLODIPine (NORVASC) 10 MG tablet, Take 1 tablet by mouth Daily for 180 days., Disp: 90 tablet, Rfl: 1    busPIRone (BUSPAR) 30 MG tablet, TAKE 1 TABLET BY MOUTH DAILY, Disp: 90 tablet, Rfl: 0    Cetirizine HCl (ZyrTEC Allergy) 10 MG capsule, , Disp: , Rfl:     Cholecalciferol (D3 High Potency) 250 MCG (59058 UT) capsule, , Disp: , Rfl:     clindamycin (CLEOCIN) 300 MG capsule, , Disp: , Rfl:     Coenzyme Q10 (Co Q-10) 300 MG capsule, Take  by mouth., Disp: , Rfl:     Ginkgo Biloba 40 MG tablet, Take 1 tablet by mouth daily., Disp: , Rfl:     latanoprost (XALATAN) 0.005 % ophthalmic solution, Administer 1 drop to both eyes Every Night., Disp: , Rfl:     levothyroxine (SYNTHROID, LEVOTHROID) 25 MCG tablet, TAKE 1 TABLET BY MOUTH DAILY, Disp: 90 tablet, Rfl: 0    naproxen sodium (Aleve) 220 MG tablet, , Disp: , Rfl:     rosuvastatin (CRESTOR) 10 MG tablet, TAKE 1 TABLET BY MOUTH  DAILY, Disp: 30 tablet, Rfl: 0    valACYclovir (VALTREX) 1000 MG tablet, Take 1 tablet by mouth 2 (Two) Times a Day., Disp: , Rfl:     valsartan-hydrochlorothiazide (DIOVAN-HCT) 320-25 MG per tablet, TAKE 1 TABLET BY MOUTH ONCE DAILY, Disp: 30 tablet, Rfl: 0    venlafaxine XR (EFFEXOR-XR) 150 MG 24 hr capsule, Take 1 capsule by mouth Daily., Disp: 90 capsule, Rfl: 1         Return in about 1 year (around 10/24/2025).      Part of this note may be an electronic transcription/translation of spoken language to printed text using the Dragon Dictation System.

## 2024-10-28 ENCOUNTER — OFFICE VISIT (OUTPATIENT)
Dept: INTERNAL MEDICINE | Facility: CLINIC | Age: 64
End: 2024-10-28
Payer: COMMERCIAL

## 2024-10-28 VITALS
DIASTOLIC BLOOD PRESSURE: 60 MMHG | HEIGHT: 67 IN | OXYGEN SATURATION: 98 % | SYSTOLIC BLOOD PRESSURE: 118 MMHG | HEART RATE: 68 BPM | WEIGHT: 231 LBS | RESPIRATION RATE: 16 BRPM | TEMPERATURE: 97.2 F | BODY MASS INDEX: 36.26 KG/M2

## 2024-10-28 DIAGNOSIS — H93.8X3 SENSATION OF FULLNESS IN BOTH EARS: Primary | ICD-10-CM

## 2024-10-28 DIAGNOSIS — I10 PRIMARY HYPERTENSION: ICD-10-CM

## 2024-10-28 LAB
ALBUMIN SERPL-MCNC: 4.1 G/DL (ref 3.5–5.2)
ALBUMIN/GLOB SERPL: 1.8 G/DL
ALP SERPL-CCNC: 83 U/L (ref 39–117)
ALT SERPL-CCNC: 12 U/L (ref 1–33)
AST SERPL-CCNC: 15 U/L (ref 1–32)
BASOPHILS # BLD AUTO: 0.06 10*3/MM3 (ref 0–0.2)
BASOPHILS NFR BLD AUTO: 0.9 % (ref 0–1.5)
BILIRUB SERPL-MCNC: 0.4 MG/DL (ref 0–1.2)
BUN SERPL-MCNC: 9 MG/DL (ref 8–23)
BUN/CREAT SERPL: 14.5 (ref 7–25)
CALCIUM SERPL-MCNC: 9.4 MG/DL (ref 8.6–10.5)
CHLORIDE SERPL-SCNC: 99 MMOL/L (ref 98–107)
CHOLEST SERPL-MCNC: 142 MG/DL (ref 0–200)
CO2 SERPL-SCNC: 29.7 MMOL/L (ref 22–29)
CREAT SERPL-MCNC: 0.62 MG/DL (ref 0.57–1)
EGFRCR SERPLBLD CKD-EPI 2021: 99.6 ML/MIN/1.73
EOSINOPHIL # BLD AUTO: 0.12 10*3/MM3 (ref 0–0.4)
EOSINOPHIL NFR BLD AUTO: 1.8 % (ref 0.3–6.2)
ERYTHROCYTE [DISTWIDTH] IN BLOOD BY AUTOMATED COUNT: 12.4 % (ref 12.3–15.4)
GLOBULIN SER CALC-MCNC: 2.3 GM/DL
GLUCOSE SERPL-MCNC: 89 MG/DL (ref 65–99)
HBA1C MFR BLD: 5.7 % (ref 4.8–5.6)
HCT VFR BLD AUTO: 40.2 % (ref 34–46.6)
HDLC SERPL-MCNC: 63 MG/DL (ref 40–60)
HGB BLD-MCNC: 13 G/DL (ref 12–15.9)
IMM GRANULOCYTES # BLD AUTO: 0.02 10*3/MM3 (ref 0–0.05)
IMM GRANULOCYTES NFR BLD AUTO: 0.3 % (ref 0–0.5)
LDLC SERPL CALC-MCNC: 66 MG/DL (ref 0–100)
LDLC/HDLC SERPL: 1.06 {RATIO}
LYMPHOCYTES # BLD AUTO: 1.6 10*3/MM3 (ref 0.7–3.1)
LYMPHOCYTES NFR BLD AUTO: 23.5 % (ref 19.6–45.3)
MCH RBC QN AUTO: 27.4 PG (ref 26.6–33)
MCHC RBC AUTO-ENTMCNC: 32.3 G/DL (ref 31.5–35.7)
MCV RBC AUTO: 84.6 FL (ref 79–97)
MONOCYTES # BLD AUTO: 0.5 10*3/MM3 (ref 0.1–0.9)
MONOCYTES NFR BLD AUTO: 7.4 % (ref 5–12)
NEUTROPHILS # BLD AUTO: 4.5 10*3/MM3 (ref 1.7–7)
NEUTROPHILS NFR BLD AUTO: 66.1 % (ref 42.7–76)
NRBC BLD AUTO-RTO: 0 /100 WBC (ref 0–0.2)
PLATELET # BLD AUTO: 356 10*3/MM3 (ref 140–450)
POTASSIUM SERPL-SCNC: 4 MMOL/L (ref 3.5–5.2)
PROT SERPL-MCNC: 6.4 G/DL (ref 6–8.5)
RBC # BLD AUTO: 4.75 10*6/MM3 (ref 3.77–5.28)
SODIUM SERPL-SCNC: 138 MMOL/L (ref 136–145)
TRIGL SERPL-MCNC: 62 MG/DL (ref 0–150)
TSH SERPL DL<=0.005 MIU/L-ACNC: 0.67 UIU/ML (ref 0.27–4.2)
VLDLC SERPL CALC-MCNC: 13 MG/DL (ref 5–40)
WBC # BLD AUTO: 6.8 10*3/MM3 (ref 3.4–10.8)

## 2024-10-28 NOTE — PROGRESS NOTES
"Chief Complaint  Ear Fullness    Subjective        Alice Akins presents to clinic today for ear check.    Patient was getting lab work today and remembered that we did not check ears at last visit. No acute complaints besides some occasional popping and sense of fullness she has been experiencing after a recent flight. No trouble hearing or discharge reported.    She also asked about her BP at this time. States that since increasing amlodipine to 10mg daily she has been getting lower readings at her clinic checks, SBP typically in the ~110s. I told her that I am happy with these readings as long as she is not experiencing symptoms of lightheadedness or dizziness, which she states that she is not currently.     Objective   Vital Signs:  /60   Pulse 68   Temp 97.2 °F (36.2 °C) (Temporal)   Resp 16   Ht 170.2 cm (67\")   Wt 105 kg (231 lb)   SpO2 98%   BMI 36.18 kg/m²   Estimated body mass index is 36.18 kg/m² as calculated from the following:    Height as of this encounter: 170.2 cm (67\").    Weight as of this encounter: 105 kg (231 lb).    Physical Exam  Constitutional:       Appearance: Normal appearance. She is normal weight.   HENT:      Right Ear: Tympanic membrane and ear canal normal.      Left Ear: Tympanic membrane and ear canal normal.      Nose: No congestion.      Mouth/Throat:      Mouth: Mucous membranes are moist.      Pharynx: Oropharynx is clear.   Eyes:      Extraocular Movements: Extraocular movements intact.      Conjunctiva/sclera: Conjunctivae normal.      Pupils: Pupils are equal, round, and reactive to light.   Musculoskeletal:         General: No swelling or deformity. Normal range of motion.      Right lower leg: No edema.      Left lower leg: No edema.   Skin:     General: Skin is warm.   Neurological:      General: No focal deficit present.      Mental Status: She is oriented to person, place, and time. Mental status is at baseline.   Psychiatric:         Mood and Affect: " Mood normal.         Behavior: Behavior normal.       Result Review :                  Assessment and Plan   Diagnoses and all orders for this visit:    1. Sensation of fullness in both ears (Primary)  Comments:  Since recent flight  PE benign    2. Primary hypertension  Assessment & Plan:  Hypertension is stable and controlled  Continue current treatment regimen.  Blood pressure will be reassessed  at next clinic appointment .             Follow Up   No follow-ups on file.    Patient was given instructions and counseling regarding her condition or for health maintenance advice. Please see specific information pulled into the AVS if appropriate.     Cheryl Monet MD

## 2024-10-28 NOTE — ASSESSMENT & PLAN NOTE
Hypertension is stable and controlled  Continue current treatment regimen.  Blood pressure will be reassessed  at next clinic appointment .

## 2024-11-14 DIAGNOSIS — I10 PRIMARY HYPERTENSION: ICD-10-CM

## 2024-11-14 RX ORDER — VALSARTAN AND HYDROCHLOROTHIAZIDE 320; 25 MG/1; MG/1
1 TABLET, FILM COATED ORAL DAILY
Qty: 30 TABLET | Refills: 0 | Status: SHIPPED | OUTPATIENT
Start: 2024-11-14

## 2024-11-15 RX ORDER — ROSUVASTATIN CALCIUM 10 MG/1
10 TABLET, COATED ORAL DAILY
Qty: 30 TABLET | Refills: 0 | Status: SHIPPED | OUTPATIENT
Start: 2024-11-15

## 2024-12-09 DIAGNOSIS — I10 PRIMARY HYPERTENSION: ICD-10-CM

## 2024-12-09 RX ORDER — ROSUVASTATIN CALCIUM 10 MG/1
10 TABLET, COATED ORAL DAILY
Qty: 30 TABLET | Refills: 0 | Status: SHIPPED | OUTPATIENT
Start: 2024-12-09 | End: 2024-12-13 | Stop reason: SDUPTHER

## 2024-12-09 RX ORDER — VALSARTAN AND HYDROCHLOROTHIAZIDE 320; 25 MG/1; MG/1
1 TABLET, FILM COATED ORAL DAILY
Qty: 30 TABLET | Refills: 0 | Status: SHIPPED | OUTPATIENT
Start: 2024-12-09 | End: 2024-12-13 | Stop reason: SDUPTHER

## 2024-12-13 ENCOUNTER — OFFICE VISIT (OUTPATIENT)
Dept: NEUROLOGY | Facility: CLINIC | Age: 64
End: 2024-12-13
Payer: COMMERCIAL

## 2024-12-13 ENCOUNTER — LAB (OUTPATIENT)
Dept: LAB | Facility: HOSPITAL | Age: 64
End: 2024-12-13
Payer: COMMERCIAL

## 2024-12-13 ENCOUNTER — TELEPHONE (OUTPATIENT)
Dept: NEUROLOGY | Facility: CLINIC | Age: 64
End: 2024-12-13

## 2024-12-13 VITALS
WEIGHT: 230 LBS | HEIGHT: 67 IN | DIASTOLIC BLOOD PRESSURE: 62 MMHG | SYSTOLIC BLOOD PRESSURE: 122 MMHG | OXYGEN SATURATION: 98 % | HEART RATE: 82 BPM | BODY MASS INDEX: 36.1 KG/M2

## 2024-12-13 DIAGNOSIS — E53.8 VITAMIN B12 DEFICIENCY: ICD-10-CM

## 2024-12-13 DIAGNOSIS — G60.9 IDIOPATHIC PERIPHERAL NEUROPATHY: Primary | ICD-10-CM

## 2024-12-13 DIAGNOSIS — I10 PRIMARY HYPERTENSION: Chronic | ICD-10-CM

## 2024-12-13 LAB — VIT B12 BLD-MCNC: 382 PG/ML (ref 211–946)

## 2024-12-13 PROCEDURE — 82607 VITAMIN B-12: CPT | Performed by: NURSE PRACTITIONER

## 2024-12-13 PROCEDURE — 36415 COLL VENOUS BLD VENIPUNCTURE: CPT | Performed by: NURSE PRACTITIONER

## 2024-12-13 RX ORDER — VALSARTAN AND HYDROCHLOROTHIAZIDE 320; 25 MG/1; MG/1
1 TABLET, FILM COATED ORAL DAILY
Qty: 30 TABLET | Refills: 1 | Status: SHIPPED | OUTPATIENT
Start: 2024-12-13

## 2024-12-13 RX ORDER — ROSUVASTATIN CALCIUM 10 MG/1
10 TABLET, COATED ORAL DAILY
Qty: 30 TABLET | Refills: 1 | Status: SHIPPED | OUTPATIENT
Start: 2024-12-13

## 2024-12-13 RX ORDER — AMLODIPINE BESYLATE 10 MG/1
10 TABLET ORAL DAILY
Qty: 90 TABLET | Refills: 1 | Status: SHIPPED | OUTPATIENT
Start: 2024-12-13 | End: 2025-06-11

## 2024-12-13 RX ORDER — NEEDLES, SAFETY 18GX1 1/2"
1 NEEDLE, DISPOSABLE MISCELLANEOUS SEE ADMIN INSTRUCTIONS
Qty: 12 EACH | Refills: 0 | Status: SHIPPED | OUTPATIENT
Start: 2024-12-13

## 2024-12-13 RX ORDER — BUSPIRONE HYDROCHLORIDE 30 MG/1
30 TABLET ORAL DAILY
Qty: 90 TABLET | Refills: 1 | Status: SHIPPED | OUTPATIENT
Start: 2024-12-13

## 2024-12-13 RX ORDER — LEVOTHYROXINE SODIUM 25 UG/1
25 TABLET ORAL DAILY
Qty: 90 TABLET | Refills: 1 | Status: SHIPPED | OUTPATIENT
Start: 2024-12-13

## 2024-12-13 RX ORDER — CHOLECALCIFEROL (VITAMIN D3) 50 MCG
2000 TABLET ORAL DAILY
COMMUNITY

## 2024-12-13 RX ORDER — CYANOCOBALAMIN 1000 UG/ML
INJECTION, SOLUTION INTRAMUSCULAR; SUBCUTANEOUS
Qty: 12 ML | Refills: 0 | Status: SHIPPED | OUTPATIENT
Start: 2024-12-13

## 2024-12-13 RX ORDER — VENLAFAXINE HYDROCHLORIDE 150 MG/1
150 CAPSULE, EXTENDED RELEASE ORAL DAILY
Qty: 90 CAPSULE | Refills: 1 | Status: SHIPPED | OUTPATIENT
Start: 2024-12-13

## 2024-12-13 NOTE — TELEPHONE ENCOUNTER
Caller: Alice Akins    Relationship: Self    Best call back number: [phone]     Requested Prescriptions:   Requested Prescriptions     Pending Prescriptions Disp Refills    levothyroxine (SYNTHROID, LEVOTHROID) 25 MCG tablet 90 tablet 0     Sig: Take 1 tablet by mouth Daily.    amLODIPine (NORVASC) 10 MG tablet 90 tablet 1     Sig: Take 1 tablet by mouth Daily for 180 days.    venlafaxine XR (EFFEXOR-XR) 150 MG 24 hr capsule 90 capsule 1     Sig: Take 1 capsule by mouth Daily.    valsartan-hydrochlorothiazide (DIOVAN-HCT) 320-25 MG per tablet 30 tablet 0     Sig: Take 1 tablet by mouth Daily.    rosuvastatin (CRESTOR) 10 MG tablet 30 tablet 0     Sig: Take 1 tablet by mouth Daily.    busPIRone (BUSPAR) 30 MG tablet 90 tablet 0     Sig: Take 1 tablet by mouth Daily.        Pharmacy where request should be sent: [id number] [place], [place] - [facility] - [phone] [facility] [phone] FX     Last office visit with prescribing clinician: 10/28/2024   Last telemedicine visit with prescribing clinician: Visit date not found   Next office visit with prescribing clinician: 4/25/2025     Would you like a call back once the refill request has been completed: [] Yes [x] No    If the office needs to give you a call back, can they leave a voicemail: [] Yes [x] No    Nikolay Silver Rep   12/13/24 13:19 EST

## 2024-12-13 NOTE — PROGRESS NOTES
[facility] NEUROLOGY         Date of Visit: 2024    Name: Alice Akins    :  1960    PCP: Cheryl Monet MD    Visit Type: follow-up         Subjective     Patient ID: Alice is a 64 y.o. female.         History of Present Illness  I had the pleasure of seeing your patient today.  As you may know she is a 64-year-old female here today for follow-up for complaints of multiple falls, balance difficulty, and numbness and tingling of the lower extremities and hands.  She was referred by her primary care physician.    History:    Patient does have medical history of hypertension and hypothyroid.    Patient intially presented to our office in 2023 for falls and numbness of feet.  See office note from that date for more detailed historical information.    In November patient was started on B12 injections for vitamin B12 deficiency in the 200s.  She also had an MRI of the brain which came back within normal limits.  Patient did end up undergoing EMG study in January which revealed evidence for peripheral neuropathy with no radiculopathies or other abnormalities.  She also went thorough laboratory workup for reversible causes for neuropathy symptoms with no other additional abnormalities.    Current:    Patient states that overall she continues to have some balance healthy as well as decreased sensation in the feet.  Reports she has had some mild painful paresthesias of the top of the right foot from approximately just above the ankle to midway on the top of the foot.  This is typically bothersome if she is tying shoelaces on her feet.  She states that it is not extremely painful.  She states otherwise her neuropathy symptoms are actually well-maintained.  Her balance is doing fairly well.  She has been walking daily her neighbor's dog and has noticed that she is doing good with this.  She has not had any falls.  She has learned to go a little slower and take her time and be more  aware of the ground beneath her which has been very helpful.     Did recheck patient's B12 level at last appointment.  It was in the 900s.  She has been having some increasing paresthesias so we will recheck again today to make sure it is not dropping off that she has not been doing any kind of B12 supplementation.  She denies any other new or worsening neurologic complaints at today's visit.      The following portions of the patient's history were reviewed and updated as appropriate: allergies, current medications, past family history, past medical history, past social history, past surgical history, and problem list.                 Review of Systems   Constitutional:  Negative for activity change, appetite change, fatigue and unexpected weight change.   HENT:  Negative for tinnitus and trouble swallowing.    Eyes:  Negative for visual disturbance.   Respiratory:  Negative for chest tightness and shortness of breath.    Gastrointestinal:  Negative for constipation, diarrhea, nausea and vomiting.   Genitourinary:  Negative for difficulty urinating, frequency and urgency.   Musculoskeletal:  Positive for back pain and gait problem. Negative for neck pain.   Neurological:  Positive for numbness. Negative for dizziness, tremors, seizures, syncope, facial asymmetry, speech difficulty, weakness, light-headedness and headaches.   Psychiatric/Behavioral:  Negative for confusion and sleep disturbance.             Current Medications:    Current Outpatient Medications   Medication Instructions    amLODIPine (NORVASC) 10 mg, Oral, Daily    busPIRone (BUSPAR) 30 mg, Oral, Daily    Cetirizine HCl (ZyrTEC Allergy) 10 MG capsule     clindamycin (CLEOCIN) 300 MG capsule     Coenzyme Q10 (Co Q-10) 300 MG capsule Take  by mouth.    Ginkgo Biloba 40 MG tablet 1 tablet, Daily    latanoprost (XALATAN) 0.005 % ophthalmic solution 1 drop, Nightly    levothyroxine (SYNTHROID, LEVOTHROID) 25 mcg, Oral, Daily    naproxen sodium (Aleve)  "220 MG tablet     rosuvastatin (CRESTOR) 10 mg, Oral, Daily    valACYclovir (VALTREX) 1,000 mg, 2 Times Daily    valsartan-hydrochlorothiazide (DIOVAN-HCT) 320-25 MG per tablet 1 tablet, Oral, Daily    venlafaxine XR (EFFEXOR-XR) 150 mg, Oral, Daily    Vitamin D 2,000 Units, Daily          /62   Pulse 82   Ht 170.2 cm (67.01\")   Wt 104 kg (230 lb)   SpO2 98%   BMI 36.01 kg/m²                Objective     Neurological Exam  Mental Status  Awake, alert and oriented to person, place and time. Speech is normal. Language is fluent with no aphasia.    Cranial Nerves  CN II: Visual fields full to confrontation.  CN III, IV, VI: Extraocular movements intact bilaterally. Normal lids and orbits bilaterally. Pupils equal round and reactive to light bilaterally.  CN V: Facial sensation is normal.  CN VII: Full and symmetric facial movement.  CN IX, X: Palate elevates symmetrically  CN XI: Shoulder shrug strength is normal.  CN XII: Tongue midline without atrophy or fasciculations.    Motor  Normal muscle bulk throughout. Normal muscle tone. No abnormal involuntary movements. Strength is 5/5 throughout all four extremities.    Sensory  Light touch abnormality: Decreased medial part of left shin normal elsewhere. Pinprick is normal in upper and lower extremities. Decreased cold to medial part of left shin normal elsewhere. Vibration abnormality: Decreased medial part of left shin normal elsewhere. Proprioception is normal in upper and lower extremities.     Reflexes  Deep tendon reflexes are 2+ and symmetric in all four extremities.    Coordination    Finger-to-nose, rapid alternating movements and heel-to-shin normal bilaterally without dysmetria.    Gait  Casual gait is normal including stance, stride, and arm swing. Toe walking abnormality: Heel walking abnormality: Tandem gait abnormality: Romberg is absent. Able to rise from chair without using arms.      Physical Exam  Constitutional:       Appearance: Normal " appearance. She is normal weight.   Eyes:      General: Lids are normal.      Extraocular Movements: Extraocular movements intact.      Pupils: Pupils are equal, round, and reactive to light.   Pulmonary:      Effort: Pulmonary effort is normal.   Skin:     General: Skin is warm.   Neurological:      Motor: Motor strength is normal.     Coordination: Coordination is intact. Romberg sign negative.      Deep Tendon Reflexes: Reflexes are normal and symmetric.   Psychiatric:         Mood and Affect: Mood normal.         Speech: Speech normal.         Behavior: Behavior normal.                     Assessment & Plan     Diagnoses and all orders for this visit:    1. Idiopathic peripheral neuropathy (Primary)    2. Vitamin B12 deficiency  -     Vitamin B12           At this time we will continue to monitor neuropathy symptoms.  We will go ahead and check B12 level again today.    Follow-up in 1 year or sooner if needed.            Suzi ALEJANDRE    Neurology    [facility]    Phone: [phone]

## 2024-12-13 NOTE — TELEPHONE ENCOUNTER
Spoke with patient.  B12 has dropped low again.  Will restart B12 injection protocol.  Follow-up level in 6 months.

## 2025-02-18 DIAGNOSIS — I10 PRIMARY HYPERTENSION: Chronic | ICD-10-CM

## 2025-02-18 RX ORDER — VALSARTAN AND HYDROCHLOROTHIAZIDE 320; 25 MG/1; MG/1
1 TABLET, FILM COATED ORAL DAILY
Qty: 30 TABLET | Refills: 0 | Status: SHIPPED | OUTPATIENT
Start: 2025-02-18

## 2025-02-18 RX ORDER — ROSUVASTATIN CALCIUM 10 MG/1
10 TABLET, COATED ORAL DAILY
Qty: 30 TABLET | Refills: 0 | Status: SHIPPED | OUTPATIENT
Start: 2025-02-18

## 2025-04-03 DIAGNOSIS — I10 PRIMARY HYPERTENSION: Chronic | ICD-10-CM

## 2025-04-04 RX ORDER — VALSARTAN AND HYDROCHLOROTHIAZIDE 320; 25 MG/1; MG/1
1 TABLET, FILM COATED ORAL DAILY
Qty: 30 TABLET | Refills: 0 | Status: SHIPPED | OUTPATIENT
Start: 2025-04-04

## 2025-04-04 RX ORDER — ROSUVASTATIN CALCIUM 10 MG/1
10 TABLET, COATED ORAL DAILY
Qty: 30 TABLET | Refills: 0 | Status: SHIPPED | OUTPATIENT
Start: 2025-04-04

## 2025-04-10 ENCOUNTER — TELEPHONE (OUTPATIENT)
Dept: INTERNAL MEDICINE | Facility: CLINIC | Age: 65
End: 2025-04-10
Payer: COMMERCIAL

## 2025-04-10 NOTE — TELEPHONE ENCOUNTER
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

## 2025-04-25 ENCOUNTER — OFFICE VISIT (OUTPATIENT)
Dept: INTERNAL MEDICINE | Facility: CLINIC | Age: 65
End: 2025-04-25
Payer: MEDICARE

## 2025-04-25 VITALS
HEART RATE: 64 BPM | WEIGHT: 233 LBS | SYSTOLIC BLOOD PRESSURE: 148 MMHG | HEIGHT: 67 IN | BODY MASS INDEX: 36.57 KG/M2 | DIASTOLIC BLOOD PRESSURE: 78 MMHG | RESPIRATION RATE: 18 BRPM | OXYGEN SATURATION: 98 %

## 2025-04-25 DIAGNOSIS — I10 PRIMARY HYPERTENSION: Primary | Chronic | ICD-10-CM

## 2025-04-25 DIAGNOSIS — R29.6 FREQUENT FALLS: Chronic | ICD-10-CM

## 2025-04-25 DIAGNOSIS — Z23 NEED FOR PNEUMOCOCCAL 20-VALENT CONJUGATE VACCINATION: ICD-10-CM

## 2025-04-25 DIAGNOSIS — E78.2 MIXED HYPERLIPIDEMIA: Chronic | ICD-10-CM

## 2025-04-25 DIAGNOSIS — R73.03 PREDIABETES: ICD-10-CM

## 2025-04-25 DIAGNOSIS — E03.9 HYPOTHYROIDISM, UNSPECIFIED TYPE: Chronic | ICD-10-CM

## 2025-04-25 DIAGNOSIS — K21.00 GASTROESOPHAGEAL REFLUX DISEASE WITH ESOPHAGITIS, UNSPECIFIED WHETHER HEMORRHAGE: Chronic | ICD-10-CM

## 2025-04-25 PROBLEM — J30.0 VASOMOTOR RHINITIS: Status: ACTIVE | Noted: 2024-04-02

## 2025-04-25 PROBLEM — M17.11 OSTEOARTHRITIS OF RIGHT KNEE: Status: ACTIVE | Noted: 2022-09-30

## 2025-04-25 PROBLEM — J34.2 DEVIATED NASAL SEPTUM: Status: ACTIVE | Noted: 2024-04-02

## 2025-04-25 PROBLEM — R94.31 ABNORMAL EKG: Status: RESOLVED | Noted: 2022-10-11 | Resolved: 2025-04-25

## 2025-04-25 PROBLEM — H90.3 SENSORINEURAL HEARING LOSS (SNHL) OF BOTH EARS: Status: ACTIVE | Noted: 2023-03-21

## 2025-04-25 PROBLEM — E78.00 PURE HYPERCHOLESTEROLEMIA: Status: RESOLVED | Noted: 2024-03-13 | Resolved: 2025-04-25

## 2025-04-25 PROBLEM — M77.41 METATARSALGIA OF RIGHT FOOT: Status: ACTIVE | Noted: 2022-09-20

## 2025-04-25 PROBLEM — M20.11 ACQUIRED HALLUX VALGUS OF RIGHT FOOT: Status: ACTIVE | Noted: 2022-09-20

## 2025-04-25 LAB
BUN SERPL-MCNC: 8 MG/DL (ref 8–23)
BUN/CREAT SERPL: 13.1 (ref 7–25)
CALCIUM SERPL-MCNC: 9.8 MG/DL (ref 8.6–10.5)
CHLORIDE SERPL-SCNC: 101 MMOL/L (ref 98–107)
CO2 SERPL-SCNC: 27 MMOL/L (ref 22–29)
CREAT SERPL-MCNC: 0.61 MG/DL (ref 0.57–1)
EGFRCR SERPLBLD CKD-EPI 2021: 99.4 ML/MIN/1.73
GLUCOSE SERPL-MCNC: 92 MG/DL (ref 65–99)
POTASSIUM SERPL-SCNC: 4 MMOL/L (ref 3.5–5.2)
SODIUM SERPL-SCNC: 138 MMOL/L (ref 136–145)
TSH SERPL DL<=0.005 MIU/L-ACNC: 0.84 UIU/ML (ref 0.27–4.2)

## 2025-04-25 RX ORDER — ALENDRONATE SODIUM 70 MG/1
70 TABLET ORAL
COMMUNITY

## 2025-04-25 RX ORDER — AMLODIPINE BESYLATE 10 MG/1
10 TABLET ORAL DAILY
Qty: 90 TABLET | Refills: 1 | Status: SHIPPED | OUTPATIENT
Start: 2025-04-25 | End: 2025-10-22

## 2025-04-25 RX ORDER — ROSUVASTATIN CALCIUM 10 MG/1
10 TABLET, COATED ORAL DAILY
Qty: 90 TABLET | Refills: 1 | Status: SHIPPED | OUTPATIENT
Start: 2025-04-25

## 2025-04-25 RX ORDER — ESCITALOPRAM OXALATE 10 MG/1
10 TABLET ORAL DAILY
COMMUNITY

## 2025-04-25 NOTE — PATIENT INSTRUCTIONS
I recommend obtaining COVID vaccination from your local pharmacy.     Please stop thyroid medication two to three months prior to next clinic visit.

## 2025-04-25 NOTE — PROGRESS NOTES
"Chief Complaint  HTN, anxiety/depression, polyneuropathy    Subjective        Alice Akins presents to clinic today for follow up.    Follows with neurology for management of idiopathic polyneuropathy which has led to multiple falls. Undergoing vitamin B12 injection with their service. Reports sensations of nausea and sweating at times, often triggered by alcohol or rapid standing. She states that she underwent stress testing with cardiology which was normal. Cardiology recommend that patient follow up with neurology as symptoms were reminiscent of vagal verve abnormality. Counseled patient on vasovagal episodes and provided info in Wrap Up.     SSRI changed from Effexor to Lexapro by her psychiatrist, and patient reports that depression and anxiety symptoms are fairly well controlled with combo of Lexapro and BuSpar.     Patient reports compliance with amlodipine 10mg daily. Denies shortness of breath or chest pain symptoms. BP borderline in clinic today. Will continue to monitor for now. Advised routine monitoring at home as well.     Patient on a very low dose of levothyroxine. Has a history of removal of one thyroid lobe in the past. We discussed her taking a medication holiday 2-3 months prior to our next appointment and seeing where her TSH is at next visit in office. If it is in normal range without medication, can discuss possibly coming off of levothyroxine completely.   History of Present Illness      Objective   Vital Signs:  /78 (BP Location: Left arm, Patient Position: Sitting, Cuff Size: Adult)   Pulse 64   Resp 18   Ht 170.2 cm (67\")   Wt 106 kg (233 lb)   SpO2 98%   BMI 36.49 kg/m²   Estimated body mass index is 36.49 kg/m² as calculated from the following:    Height as of this encounter: 170.2 cm (67\").    Weight as of this encounter: 106 kg (233 lb).        Physical Exam  Constitutional:       General: She is not in acute distress.     Appearance: Normal appearance.   Pulmonary:      " "Effort: Pulmonary effort is normal. No respiratory distress.   Musculoskeletal:         General: No swelling or deformity. Normal range of motion.   Skin:     General: Skin is warm and dry.   Neurological:      General: No focal deficit present.      Mental Status: She is oriented to person, place, and time. Mental status is at baseline.   Psychiatric:         Mood and Affect: Mood normal.         Behavior: Behavior normal.     Result Review :               Current Outpatient Medications:     alendronate (Fosamax) 70 MG tablet, Take 1 tablet by mouth Every 7 (Seven) Days., Disp: , Rfl:     amLODIPine (NORVASC) 10 MG tablet, Take 1 tablet by mouth Daily for 180 days., Disp: 90 tablet, Rfl: 1    busPIRone (BUSPAR) 30 MG tablet, Take 1 tablet by mouth Daily., Disp: 90 tablet, Rfl: 1    Cetirizine HCl (ZyrTEC Allergy) 10 MG capsule, , Disp: , Rfl:     Cholecalciferol (Vitamin D) 50 MCG (2000 UT) tablet, Take 1 tablet by mouth Daily., Disp: , Rfl:     clindamycin (CLEOCIN) 300 MG capsule, Take 1 capsule by mouth As Needed., Disp: , Rfl:     Coenzyme Q10 (Co Q-10) 300 MG capsule, Take  by mouth., Disp: , Rfl:     cyanocobalamin 1000 MCG/ML injection, 1 mL q week x 4 weeks, 1 mL every other week x 4 months, Disp: 12 mL, Rfl: 0    escitalopram (LEXAPRO) 10 MG tablet, Take 1 tablet by mouth Daily., Disp: , Rfl:     Ginkgo Biloba 40 MG tablet, Take 1 tablet by mouth Daily., Disp: , Rfl:     latanoprost (XALATAN) 0.005 % ophthalmic solution, Administer 1 drop to both eyes Every Night., Disp: , Rfl:     levothyroxine (SYNTHROID, LEVOTHROID) 25 MCG tablet, Take 1 tablet by mouth Daily., Disp: 90 tablet, Rfl: 1    naproxen sodium (Aleve) 220 MG tablet, , Disp: , Rfl:     rosuvastatin (CRESTOR) 10 MG tablet, Take 1 tablet by mouth Daily., Disp: 90 tablet, Rfl: 1    Syringe/Needle, Disp, (BD Eclipse Syringe) 25G X 1\" 3 ML misc, Use 1 syringe See Admin Instructions for 12 doses. Use with B12, Disp: 12 each, Rfl: 0    valACYclovir " (VALTREX) 1000 MG tablet, Take 1 tablet by mouth As Needed., Disp: , Rfl:     valsartan-hydrochlorothiazide (DIOVAN-HCT) 320-25 MG per tablet, Take 1 tablet by mouth Daily., Disp: 30 tablet, Rfl: 0      Assessment and Plan   Diagnoses and all orders for this visit:    1. Primary hypertension (Primary)  Assessment & Plan:  Hypertension is borderline  Ambulatory BP monitoring  Continue current treatment regimen.  Blood pressure will be reassessed in 6 months.    Orders:  -     Comprehensive Metabolic Panel; Future  -     amLODIPine (NORVASC) 10 MG tablet; Take 1 tablet by mouth Daily for 180 days.  Dispense: 90 tablet; Refill: 1  -     Basic metabolic panel    2. Mixed hyperlipidemia  -     Lipid Panel; Future  -     rosuvastatin (CRESTOR) 10 MG tablet; Take 1 tablet by mouth Daily.  Dispense: 90 tablet; Refill: 1    3. Frequent falls    4. Gastroesophageal reflux disease with esophagitis, unspecified whether hemorrhage  -     CBC & Differential; Future    5. Hypothyroidism, unspecified type  Assessment & Plan:  Can consider coming off of thyroid medication is TSH normal after drug holiday at next check.     Orders:  -     TSH Rfx On Abnormal To Free T4; Future  -     TSH Rfx On Abnormal To Free T4    6. Need for pneumococcal 20-valent conjugate vaccination  -     Pneumococcal Conjugate Vaccine 20-Valent (PCV20)    7. Prediabetes  -     Hemoglobin A1c; Future       I spent 30 minutes caring for Alice on this date of service. This time includes time spent by me in the following activities: preparing for the visit, obtaining and/or reviewing a separately obtained history, performing a medically appropriate examination and/or evaluation , counseling and educating the patient/family/caregiver, ordering medications, tests, or procedures, and documenting information in the medical record    Follow Up   Return in about 6 months (around 10/25/2025) for Welcome to Annual Medicare Wellness, lab work.    Patient was given  instructions and counseling regarding her condition or for health maintenance advice. Please see specific information pulled into the AVS if appropriate.     Cheryl Monet MD    Patient or patient representative verbalized consent for the use of Ambient Listening during the visit with  Cheryl Monet MD for chart documentation. 4/25/2025

## 2025-04-25 NOTE — ASSESSMENT & PLAN NOTE
Hypertension is borderline  Ambulatory BP monitoring  Continue current treatment regimen.  Blood pressure will be reassessed in 6 months.

## 2025-04-28 ENCOUNTER — RESULTS FOLLOW-UP (OUTPATIENT)
Dept: INTERNAL MEDICINE | Facility: CLINIC | Age: 65
End: 2025-04-28
Payer: MEDICARE

## 2025-04-30 ENCOUNTER — TRANSCRIBE ORDERS (OUTPATIENT)
Dept: ADMINISTRATIVE | Facility: HOSPITAL | Age: 65
End: 2025-04-30
Payer: MEDICARE

## 2025-04-30 DIAGNOSIS — Z12.31 BREAST CANCER SCREENING BY MAMMOGRAM: Primary | ICD-10-CM

## 2025-05-07 ENCOUNTER — HOSPITAL ENCOUNTER (OUTPATIENT)
Dept: MAMMOGRAPHY | Facility: HOSPITAL | Age: 65
Discharge: HOME | End: 2025-05-07
Payer: MEDICARE

## 2025-05-07 ENCOUNTER — TELEPHONE (OUTPATIENT)
Dept: INTERNAL MEDICINE | Facility: CLINIC | Age: 65
End: 2025-05-07
Payer: MEDICARE

## 2025-05-07 DIAGNOSIS — Z12.31 BREAST CANCER SCREENING BY MAMMOGRAM: ICD-10-CM

## 2025-05-07 NOTE — TELEPHONE ENCOUNTER
Patient needs additional imaging. L breast nodule reported today by the patient at her screening.   They need additional orders    Bilateral diagnostic mammogram  Left breast u/s    Patient will need to be notified so she can call and get scheduled

## 2025-05-08 DIAGNOSIS — I10 PRIMARY HYPERTENSION: Chronic | ICD-10-CM

## 2025-05-09 ENCOUNTER — EXTERNAL PBMM DATA (OUTPATIENT)
Dept: PHARMACY | Facility: OTHER | Age: 65
End: 2025-05-09
Payer: MEDICARE

## 2025-05-09 ENCOUNTER — OFFICE VISIT (OUTPATIENT)
Dept: INTERNAL MEDICINE | Facility: CLINIC | Age: 65
End: 2025-05-09
Payer: MEDICARE

## 2025-05-09 VITALS
BODY MASS INDEX: 36.22 KG/M2 | WEIGHT: 230.8 LBS | OXYGEN SATURATION: 99 % | HEIGHT: 67 IN | SYSTOLIC BLOOD PRESSURE: 128 MMHG | HEART RATE: 69 BPM | DIASTOLIC BLOOD PRESSURE: 62 MMHG

## 2025-05-09 DIAGNOSIS — R92.30 DENSE BREAST TISSUE: ICD-10-CM

## 2025-05-09 DIAGNOSIS — N63.21 MASS OF UPPER OUTER QUADRANT OF LEFT BREAST: Primary | ICD-10-CM

## 2025-05-09 PROCEDURE — 1125F AMNT PAIN NOTED PAIN PRSNT: CPT | Performed by: STUDENT IN AN ORGANIZED HEALTH CARE EDUCATION/TRAINING PROGRAM

## 2025-05-09 PROCEDURE — 3078F DIAST BP <80 MM HG: CPT | Performed by: STUDENT IN AN ORGANIZED HEALTH CARE EDUCATION/TRAINING PROGRAM

## 2025-05-09 PROCEDURE — 3074F SYST BP LT 130 MM HG: CPT | Performed by: STUDENT IN AN ORGANIZED HEALTH CARE EDUCATION/TRAINING PROGRAM

## 2025-05-09 PROCEDURE — 1160F RVW MEDS BY RX/DR IN RCRD: CPT | Performed by: STUDENT IN AN ORGANIZED HEALTH CARE EDUCATION/TRAINING PROGRAM

## 2025-05-09 PROCEDURE — 99213 OFFICE O/P EST LOW 20 MIN: CPT | Performed by: STUDENT IN AN ORGANIZED HEALTH CARE EDUCATION/TRAINING PROGRAM

## 2025-05-09 PROCEDURE — 1159F MED LIST DOCD IN RCRD: CPT | Performed by: STUDENT IN AN ORGANIZED HEALTH CARE EDUCATION/TRAINING PROGRAM

## 2025-05-09 RX ORDER — VALSARTAN AND HYDROCHLOROTHIAZIDE 320; 25 MG/1; MG/1
1 TABLET, FILM COATED ORAL DAILY
Qty: 30 TABLET | Refills: 0 | Status: SHIPPED | OUTPATIENT
Start: 2025-05-09

## 2025-05-09 NOTE — PROGRESS NOTES
"Chief Complaint  Mass (Bump on left breast )    Subjective        Answers submitted by the patient for this visit:  Problem not listed (Submitted on 5/7/2025)  Chief Complaint: Other medical problem  Reason for appointment: Lump in left breast  abdominal pain: No  anorexia: No  joint pain: No  change in stool: No  chest pain: No  chills: No  nasal congestion: Yes  cough: Yes  diaphoresis: No  fatigue: Yes  fever: No  headaches: No  joint swelling: No  myalgias: Yes  nausea: No  neck pain: No  numbness: No  rash: No  sore throat: No  swollen glands: No  dysuria: No  vertigo: No  visual change: Yes  vomiting: No  weakness: No  Onset: 1 to 6 months  Chronicity: chronic  Frequency: daily    Alice Akins presents to clinic today for an acute visit with a cc of lump in left breast. Patient states that she first noted the lump a few months ago. It is located near the top of the left breast. Not painful typically, but she thinks repeat manual manipulation of the area produces pain. Originally thought it was a swollen lymph node, however it has not decreased in size. She was supposed to get scheduled for her diagnostic mammogram, however now wondering if we should do more advanced imaging to evaluate the area. Breast examination performed today. Let her know we will move forward with diagnostic mammogram and ultrasound.     Most recent screening mammogram in 2023 was negative.   History of Present Illness      Objective   Vital Signs:  /62 (BP Location: Left arm, Patient Position: Sitting, Cuff Size: Adult)   Pulse 69   Ht 170.2 cm (67\")   Wt 105 kg (230 lb 12.8 oz)   SpO2 99%   BMI 36.15 kg/m²   Estimated body mass index is 36.15 kg/m² as calculated from the following:    Height as of this encounter: 170.2 cm (67\").    Weight as of this encounter: 105 kg (230 lb 12.8 oz).          Physical Exam  Constitutional:       General: She is not in acute distress.     Appearance: Normal appearance.   Pulmonary:      " Effort: Pulmonary effort is normal. No respiratory distress.   Chest:          Comments: Breast tissue is dense bilaterally. Very small, mobile, pea-size lump noted at central upper left breast region. Not TTP. Right breast completely normal   Musculoskeletal:         General: No swelling or deformity. Normal range of motion.   Skin:     General: Skin is warm and dry.   Neurological:      General: No focal deficit present.      Mental Status: She is oriented to person, place, and time. Mental status is at baseline.   Psychiatric:         Mood and Affect: Mood normal.         Behavior: Behavior normal.     Result Review :               Current Outpatient Medications:     alendronate (Fosamax) 70 MG tablet, Take 1 tablet by mouth Every 7 (Seven) Days., Disp: , Rfl:     amLODIPine (NORVASC) 10 MG tablet, Take 1 tablet by mouth Daily for 180 days., Disp: 90 tablet, Rfl: 1    busPIRone (BUSPAR) 30 MG tablet, Take 1 tablet by mouth Daily., Disp: 90 tablet, Rfl: 1    Cetirizine HCl (ZyrTEC Allergy) 10 MG capsule, , Disp: , Rfl:     Cholecalciferol (Vitamin D) 50 MCG (2000 UT) tablet, Take 1 tablet by mouth Daily., Disp: , Rfl:     clindamycin (CLEOCIN) 300 MG capsule, Take 1 capsule by mouth As Needed., Disp: , Rfl:     Coenzyme Q10 (Co Q-10) 300 MG capsule, Take  by mouth., Disp: , Rfl:     cyanocobalamin 1000 MCG/ML injection, 1 mL q week x 4 weeks, 1 mL every other week x 4 months, Disp: 12 mL, Rfl: 0    escitalopram (LEXAPRO) 10 MG tablet, Take 1 tablet by mouth Daily., Disp: , Rfl:     Ginkgo Biloba 40 MG tablet, Take 1 tablet by mouth Daily., Disp: , Rfl:     latanoprost (XALATAN) 0.005 % ophthalmic solution, Administer 1 drop to both eyes Every Night., Disp: , Rfl:     levothyroxine (SYNTHROID, LEVOTHROID) 25 MCG tablet, Take 1 tablet by mouth Daily., Disp: 90 tablet, Rfl: 1    naproxen sodium (Aleve) 220 MG tablet, , Disp: , Rfl:     rosuvastatin (CRESTOR) 10 MG tablet, Take 1 tablet by mouth Daily., Disp: 90  "tablet, Rfl: 1    Syringe/Needle, Disp, (BD Eclipse Syringe) 25G X 1\" 3 ML misc, Use 1 syringe See Admin Instructions for 12 doses. Use with B12, Disp: 12 each, Rfl: 0    valACYclovir (VALTREX) 1000 MG tablet, Take 1 tablet by mouth As Needed., Disp: , Rfl:     valsartan-hydrochlorothiazide (DIOVAN-HCT) 320-25 MG per tablet, TAKE 1 TABLET BY MOUTH DAILY., Disp: 30 tablet, Rfl: 0      Assessment and Plan   Diagnoses and all orders for this visit:    1. Mass of upper outer quadrant of left breast (Primary)  -     Mammo Diagnostic Digital Tomosynthesis Bilateral With CAD; Future  -     US breast left limited; Future    2. Dense breast tissue  -     Mammo Diagnostic Digital Tomosynthesis Bilateral With CAD; Future           Follow Up   Return for regularly scheduled appointment with me.    Patient was given instructions and counseling regarding her condition or for health maintenance advice. Please see specific information pulled into the AVS if appropriate.     Cheryl Monet MD  "

## 2025-05-27 DIAGNOSIS — I10 PRIMARY HYPERTENSION: Chronic | ICD-10-CM

## 2025-05-27 DIAGNOSIS — E78.2 MIXED HYPERLIPIDEMIA: Chronic | ICD-10-CM

## 2025-05-27 RX ORDER — LEVOTHYROXINE SODIUM 25 UG/1
25 TABLET ORAL DAILY
Qty: 90 TABLET | Refills: 1 | Status: SHIPPED | OUTPATIENT
Start: 2025-05-27

## 2025-05-27 RX ORDER — ALENDRONATE SODIUM 70 MG/1
70 TABLET ORAL
Qty: 12 TABLET | Refills: 1 | Status: SHIPPED | OUTPATIENT
Start: 2025-05-27

## 2025-05-27 RX ORDER — AMLODIPINE BESYLATE 10 MG/1
10 TABLET ORAL DAILY
Qty: 90 TABLET | Refills: 1 | Status: SHIPPED | OUTPATIENT
Start: 2025-05-27 | End: 2025-11-23

## 2025-05-27 RX ORDER — ROSUVASTATIN CALCIUM 10 MG/1
10 TABLET, COATED ORAL DAILY
Qty: 90 TABLET | Refills: 1 | Status: SHIPPED | OUTPATIENT
Start: 2025-05-27

## 2025-05-28 DIAGNOSIS — E53.8 VITAMIN B12 DEFICIENCY: ICD-10-CM

## 2025-05-28 RX ORDER — BUSPIRONE HYDROCHLORIDE 30 MG/1
30 TABLET ORAL DAILY
Qty: 90 TABLET | Refills: 1 | Status: SHIPPED | OUTPATIENT
Start: 2025-05-28

## 2025-05-29 RX ORDER — NEEDLES, SAFETY 18GX1 1/2"
1 NEEDLE, DISPOSABLE MISCELLANEOUS SEE ADMIN INSTRUCTIONS
Qty: 12 EACH | Refills: 0 | Status: SHIPPED | OUTPATIENT
Start: 2025-05-29

## 2025-05-29 RX ORDER — CYANOCOBALAMIN 1000 UG/ML
INJECTION, SOLUTION INTRAMUSCULAR; SUBCUTANEOUS
Qty: 12 ML | Refills: 0 | Status: SHIPPED | OUTPATIENT
Start: 2025-05-29

## 2025-06-23 ENCOUNTER — HOSPITAL ENCOUNTER (OUTPATIENT)
Dept: ULTRASOUND IMAGING | Facility: HOSPITAL | Age: 65
Discharge: HOME | End: 2025-06-23
Payer: MEDICARE

## 2025-06-23 ENCOUNTER — HOSPITAL ENCOUNTER (OUTPATIENT)
Dept: MAMMOGRAPHY | Facility: HOSPITAL | Age: 65
Discharge: HOME | End: 2025-06-23
Payer: MEDICARE

## 2025-06-23 DIAGNOSIS — R92.30 DENSE BREAST TISSUE: ICD-10-CM

## 2025-06-23 DIAGNOSIS — N63.21 MASS OF UPPER OUTER QUADRANT OF LEFT BREAST: ICD-10-CM

## 2025-06-23 PROCEDURE — 76642 ULTRASOUND BREAST LIMITED: CPT

## 2025-06-23 PROCEDURE — G0279 TOMOSYNTHESIS, MAMMO: HCPCS

## 2025-06-23 PROCEDURE — 77066 DX MAMMO INCL CAD BI: CPT

## 2025-07-03 DIAGNOSIS — I10 PRIMARY HYPERTENSION: Chronic | ICD-10-CM

## 2025-07-03 RX ORDER — VALSARTAN AND HYDROCHLOROTHIAZIDE 320; 25 MG/1; MG/1
1 TABLET, FILM COATED ORAL DAILY
Qty: 30 TABLET | Refills: 0 | Status: SHIPPED | OUTPATIENT
Start: 2025-07-03 | End: 2025-07-06 | Stop reason: SDUPTHER

## 2025-07-06 DIAGNOSIS — I10 PRIMARY HYPERTENSION: Chronic | ICD-10-CM

## 2025-07-06 RX ORDER — VALSARTAN AND HYDROCHLOROTHIAZIDE 320; 25 MG/1; MG/1
1 TABLET, FILM COATED ORAL DAILY
Qty: 90 TABLET | Refills: 1 | Status: SHIPPED | OUTPATIENT
Start: 2025-07-06

## 2025-07-15 DIAGNOSIS — I10 PRIMARY HYPERTENSION: Chronic | ICD-10-CM

## 2025-07-15 RX ORDER — VALSARTAN AND HYDROCHLOROTHIAZIDE 320; 25 MG/1; MG/1
1 TABLET, FILM COATED ORAL DAILY
Qty: 90 TABLET | Refills: 1 | Status: SHIPPED | OUTPATIENT
Start: 2025-07-15

## 2025-07-25 ENCOUNTER — PRE-ADMISSION TESTING (OUTPATIENT)
Dept: PREADMISSION TESTING | Facility: HOSPITAL | Age: 65
End: 2025-07-25
Payer: MEDICARE

## 2025-07-25 ENCOUNTER — HOSPITAL ENCOUNTER (OUTPATIENT)
Dept: GENERAL RADIOLOGY | Facility: HOSPITAL | Age: 65
Discharge: HOME | End: 2025-07-25
Payer: MEDICARE

## 2025-07-25 VITALS
SYSTOLIC BLOOD PRESSURE: 134 MMHG | TEMPERATURE: 98.1 F | HEIGHT: 66 IN | HEART RATE: 64 BPM | RESPIRATION RATE: 16 BRPM | DIASTOLIC BLOOD PRESSURE: 73 MMHG | OXYGEN SATURATION: 98 % | BODY MASS INDEX: 36.29 KG/M2 | WEIGHT: 225.8 LBS

## 2025-07-25 LAB
ALBUMIN SERPL-MCNC: 4.2 G/DL (ref 3.5–5.2)
ALBUMIN/GLOB SERPL: 1.5 G/DL
ALP SERPL-CCNC: 66 U/L (ref 39–117)
ALT SERPL W P-5'-P-CCNC: 14 U/L (ref 1–33)
ANION GAP SERPL CALCULATED.3IONS-SCNC: 11 MMOL/L (ref 5–15)
AST SERPL-CCNC: 21 U/L (ref 1–32)
BASOPHILS # BLD AUTO: 0.08 10*3/MM3 (ref 0–0.2)
BASOPHILS NFR BLD AUTO: 1.1 % (ref 0–1.5)
BILIRUB SERPL-MCNC: 0.3 MG/DL (ref 0–1.2)
BUN SERPL-MCNC: 9 MG/DL (ref 8–23)
BUN/CREAT SERPL: 16.1 (ref 7–25)
CALCIUM SPEC-SCNC: 9.3 MG/DL (ref 8.6–10.5)
CHLORIDE SERPL-SCNC: 98 MMOL/L (ref 98–107)
CO2 SERPL-SCNC: 27 MMOL/L (ref 22–29)
CREAT SERPL-MCNC: 0.56 MG/DL (ref 0.57–1)
DEPRECATED RDW RBC AUTO: 41.7 FL (ref 37–54)
EGFRCR SERPLBLD CKD-EPI 2021: 101.4 ML/MIN/1.73
EOSINOPHIL # BLD AUTO: 0.06 10*3/MM3 (ref 0–0.4)
EOSINOPHIL NFR BLD AUTO: 0.8 % (ref 0.3–6.2)
ERYTHROCYTE [DISTWIDTH] IN BLOOD BY AUTOMATED COUNT: 13.3 % (ref 12.3–15.4)
GLOBULIN UR ELPH-MCNC: 2.8 GM/DL
GLUCOSE SERPL-MCNC: 93 MG/DL (ref 65–99)
HCT VFR BLD AUTO: 39.2 % (ref 34–46.6)
HGB BLD-MCNC: 12.7 G/DL (ref 12–15.9)
IMM GRANULOCYTES # BLD AUTO: 0.02 10*3/MM3 (ref 0–0.05)
IMM GRANULOCYTES NFR BLD AUTO: 0.3 % (ref 0–0.5)
LYMPHOCYTES # BLD AUTO: 1.83 10*3/MM3 (ref 0.7–3.1)
LYMPHOCYTES NFR BLD AUTO: 25.4 % (ref 19.6–45.3)
MCH RBC QN AUTO: 28 PG (ref 26.6–33)
MCHC RBC AUTO-ENTMCNC: 32.4 G/DL (ref 31.5–35.7)
MCV RBC AUTO: 86.5 FL (ref 79–97)
MONOCYTES # BLD AUTO: 0.65 10*3/MM3 (ref 0.1–0.9)
MONOCYTES NFR BLD AUTO: 9 % (ref 5–12)
NEUTROPHILS NFR BLD AUTO: 4.56 10*3/MM3 (ref 1.7–7)
NEUTROPHILS NFR BLD AUTO: 63.4 % (ref 42.7–76)
NRBC BLD AUTO-RTO: 0 /100 WBC (ref 0–0.2)
PLATELET # BLD AUTO: 316 10*3/MM3 (ref 140–450)
PMV BLD AUTO: 9.7 FL (ref 6–12)
POTASSIUM SERPL-SCNC: 3.4 MMOL/L (ref 3.5–5.2)
PROT SERPL-MCNC: 7 G/DL (ref 6–8.5)
QT INTERVAL: 496 MS
QTC INTERVAL: 467 MS
RBC # BLD AUTO: 4.53 10*6/MM3 (ref 3.77–5.28)
SODIUM SERPL-SCNC: 136 MMOL/L (ref 136–145)
WBC NRBC COR # BLD AUTO: 7.2 10*3/MM3 (ref 3.4–10.8)

## 2025-07-25 PROCEDURE — 93005 ELECTROCARDIOGRAM TRACING: CPT

## 2025-07-25 PROCEDURE — 93010 ELECTROCARDIOGRAM REPORT: CPT | Performed by: INTERNAL MEDICINE

## 2025-07-25 PROCEDURE — 36415 COLL VENOUS BLD VENIPUNCTURE: CPT

## 2025-07-25 PROCEDURE — 85025 COMPLETE CBC W/AUTO DIFF WBC: CPT

## 2025-07-25 PROCEDURE — 80053 COMPREHEN METABOLIC PANEL: CPT

## 2025-07-25 PROCEDURE — 71046 X-RAY EXAM CHEST 2 VIEWS: CPT

## 2025-07-25 RX ORDER — ESCITALOPRAM OXALATE 20 MG/1
20 TABLET ORAL EVERY MORNING
COMMUNITY
Start: 2025-06-03

## 2025-07-25 RX ORDER — BETA-CAROTENE 7500 MCG
1 CAPSULE ORAL DAILY
COMMUNITY

## 2025-07-25 RX ORDER — OLOPATADINE HYDROCHLORIDE 1 MG/ML
1 SOLUTION OPHTHALMIC 2 TIMES DAILY PRN
COMMUNITY

## 2025-07-25 RX ORDER — IPRATROPIUM BROMIDE 42 UG/1
2 SPRAY, METERED NASAL 4 TIMES DAILY PRN
COMMUNITY

## 2025-07-25 NOTE — DISCHARGE INSTRUCTIONS
Take the following medications the morning of surgery:  AMLODIPINE, LEXAPRO, AND LEVOTHYROXINE IF STILL TAKING    (DO NOT TAKE VALSARTAN DAY OF SURGERY OR NIGHT PRIOR)    STOP COQ10, GINGKO NOW UNTIL AFTER SURGERY, AND HOLD ARNICA AND ALEVE 7 DAYS PRIOR TO SURGERY      If you are on an Aspirin or a Blood Thinner please clarify with the surgeon and prescribing physician if and when you are to hold the medication or if you are to continue the medication.  If you are on prescription narcotic pain medication to control your pain you may also take that medication the morning of surgery.      General Instructions:     Do not eat solid food after midnight the night before surgery.  Clear liquids day of surgery are allowed but must be stopped at least two hours before your hospital arrival time.       Allowed clear liquids      Water, sodas, and tea or coffee with no cream or milk added.       12 to 20 ounces of a clear liquid that contains carbohydrates is recommended.  If non-diabetic, have Gatorade or Powerade.  If diabetic, have G2 or Powerade Zero.     Do not have liquids red in color.  Do not consume chicken, beef, pork or vegetable broth or bouillon cubes of any variety as they are not considered clear liquids and are not allowed.      Infants may have breast milk up to four hours before surgery.  Infants drinking formula may drink formula up to six hours before surgery.   Patients who avoid smoking, chewing tobacco and alcohol for 4 weeks prior to surgery have a reduced risk of post-operative complications.  Quit smoking as many days before surgery as you can.  Do not smoke, use chewing tobacco or drink alcohol the day of surgery.   If applicable bring your C-PAP/ BI-PAP machine in with you to preop day of surgery.  Bring any papers given to you in the doctor’s office.  Wear clean comfortable clothes.  Do not wear contact lenses, false eyelashes or make-up.  Bring a case for your glasses.   Bring crutches or walker  if applicable.  Remove all piercings.  Leave jewelry and any other valuables at home.  Hair extensions with metal clips must be removed prior to surgery.  The Pre-Admission Testing nurse will instruct you to bring medications if unable to obtain an accurate list in Pre-Admission Testing.    Day of surgery you will need to let the preoperative nurse know the last time you took each of your medications.  To ensure a safe environment for patients and staff, we kindly ask that children under the age of 16 not accompany patients.  If you must bring a dependent child or dependent adult please ensure a responsible adult, other than yourself, is present to supervise them.          Preventing a Surgical Site Infection:  For 2 to 3 days before surgery, avoid shaving with a razor because the razor can irritate skin and make it easier to develop an infection.    Any areas of open skin can increase the risk of a post-operative wound infection by allowing bacteria to enter and travel throughout the body.  Notify your surgeon if you have any skin wounds / rashes even if it is not near the expected surgical site.  The area will need assessed to determine if surgery should be delayed until it is healed.  The night prior to surgery shower using a fresh bar of anti-bacterial soap (such as Dial) and clean washcloth.  Sleep in a clean bed with clean clothing.  Do not allow pets to sleep with you.  Shower on the morning of surgery using a fresh bar of anti-bacterial soap (such as Dial) and clean washcloth.  Dry with a clean towel and dress in clean clothing.  Ask your surgeon if you will be receiving antibiotics prior to surgery.  Make sure you, your family, and all healthcare providers clean their hands with soap and water or an alcohol based hand  before caring for you or your wound.    Day of surgery:  Your arrival time is approximately two hours before your scheduled surgery time.  Please note if you have an early arrival  time the surgery doors do not open before 5:00 AM.  Upon arrival, a Pre-op nurse and Anesthesiologist will review your health history, obtain vital signs, and answer questions you may have.  The only belongings needed at this time will be a list of your home medications and if applicable your C-PAP/BI-PAP machine.  A Pre-op nurse will start an IV and you may receive medication in preparation for surgery, including something to help you relax.     Please be aware that surgery does come with discomfort.  We want to make every effort to control your discomfort so please discuss any uncontrolled symptoms with your nurse.   Your doctor will most likely have prescribed pain medications.      If you are going home after surgery you will receive individualized written care instructions before being discharged.  A responsible adult must drive you to and from the hospital on the day of your surgery and ideally stay with you through the night.   .  Discharge prescriptions can be filled by the hospital pharmacy during regular pharmacy hours.  If you are having surgery late in the day/evening your prescription may be e-prescribed to your pharmacy.  Please verify your pharmacy hours or chose a 24 hour pharmacy to avoid not having access to your prescription because your pharmacy has closed for the day.    If you are staying overnight following surgery, you will be transported to your hospital room following the recovery period.  [facility] has all private rooms.    If you have any questions please call Pre-Admission Testing at [phone].  Deductibles and co-payments are collected on the day of service. Please be prepared to pay the required co-pay, deductible or deposit on the day of service as defined by your plan.    Call your surgeon immediately if you experience any of the following symptoms:  Sore Throat  Shortness of Breath or difficulty breathing  Cough  Chills  Body soreness or muscle  pain  Headache  Fever  New loss of taste or smell  Do not arrive for your surgery ill.  Your procedure will need to be rescheduled to another time.  You will need to call your physician before the day of surgery to avoid any unnecessary exposure to hospital staff as well as other patients.